# Patient Record
Sex: MALE | Race: NATIVE HAWAIIAN OR OTHER PACIFIC ISLANDER | NOT HISPANIC OR LATINO | Employment: FULL TIME | URBAN - METROPOLITAN AREA
[De-identification: names, ages, dates, MRNs, and addresses within clinical notes are randomized per-mention and may not be internally consistent; named-entity substitution may affect disease eponyms.]

---

## 2020-08-06 ENCOUNTER — APPOINTMENT (OUTPATIENT)
Dept: RADIOLOGY | Facility: MEDICAL CENTER | Age: 50
End: 2020-08-06
Attending: EMERGENCY MEDICINE

## 2020-08-06 ENCOUNTER — HOSPITAL ENCOUNTER (EMERGENCY)
Facility: MEDICAL CENTER | Age: 50
End: 2020-08-06
Attending: EMERGENCY MEDICINE

## 2020-08-06 VITALS
SYSTOLIC BLOOD PRESSURE: 128 MMHG | HEART RATE: 83 BPM | DIASTOLIC BLOOD PRESSURE: 67 MMHG | BODY MASS INDEX: 42.66 KG/M2 | HEIGHT: 72 IN | WEIGHT: 315 LBS | RESPIRATION RATE: 13 BRPM | OXYGEN SATURATION: 96 % | TEMPERATURE: 97.5 F

## 2020-08-06 DIAGNOSIS — L73.9 FOLLICULITIS: ICD-10-CM

## 2020-08-06 DIAGNOSIS — T78.40XA ALLERGIC REACTION, INITIAL ENCOUNTER: ICD-10-CM

## 2020-08-06 LAB
ALBUMIN SERPL BCP-MCNC: 3.9 G/DL (ref 3.2–4.9)
ALBUMIN/GLOB SERPL: 1.1 G/DL
ALP SERPL-CCNC: 47 U/L (ref 30–99)
ALT SERPL-CCNC: 37 U/L (ref 2–50)
ANION GAP SERPL CALC-SCNC: 12 MMOL/L (ref 7–16)
AST SERPL-CCNC: 34 U/L (ref 12–45)
BASOPHILS # BLD AUTO: 0.4 % (ref 0–1.8)
BASOPHILS # BLD: 0.03 K/UL (ref 0–0.12)
BILIRUB SERPL-MCNC: 0.2 MG/DL (ref 0.1–1.5)
BUN SERPL-MCNC: 19 MG/DL (ref 8–22)
CALCIUM SERPL-MCNC: 8.7 MG/DL (ref 8.5–10.5)
CHLORIDE SERPL-SCNC: 100 MMOL/L (ref 96–112)
CO2 SERPL-SCNC: 23 MMOL/L (ref 20–33)
CREAT SERPL-MCNC: 1.11 MG/DL (ref 0.5–1.4)
EKG IMPRESSION: NORMAL
EOSINOPHIL # BLD AUTO: 0.23 K/UL (ref 0–0.51)
EOSINOPHIL NFR BLD: 3 % (ref 0–6.9)
ERYTHROCYTE [DISTWIDTH] IN BLOOD BY AUTOMATED COUNT: 44.9 FL (ref 35.9–50)
GLOBULIN SER CALC-MCNC: 3.5 G/DL (ref 1.9–3.5)
GLUCOSE SERPL-MCNC: 104 MG/DL (ref 65–99)
HCT VFR BLD AUTO: 46.4 % (ref 42–52)
HGB BLD-MCNC: 15.5 G/DL (ref 14–18)
IMM GRANULOCYTES # BLD AUTO: 0.03 K/UL (ref 0–0.11)
IMM GRANULOCYTES NFR BLD AUTO: 0.4 % (ref 0–0.9)
LYMPHOCYTES # BLD AUTO: 1.86 K/UL (ref 1–4.8)
LYMPHOCYTES NFR BLD: 24.3 % (ref 22–41)
MCH RBC QN AUTO: 29.8 PG (ref 27–33)
MCHC RBC AUTO-ENTMCNC: 33.4 G/DL (ref 33.7–35.3)
MCV RBC AUTO: 89.2 FL (ref 81.4–97.8)
MONOCYTES # BLD AUTO: 0.84 K/UL (ref 0–0.85)
MONOCYTES NFR BLD AUTO: 11 % (ref 0–13.4)
NEUTROPHILS # BLD AUTO: 4.66 K/UL (ref 1.82–7.42)
NEUTROPHILS NFR BLD: 60.9 % (ref 44–72)
NRBC # BLD AUTO: 0 K/UL
NRBC BLD-RTO: 0 /100 WBC
PLATELET # BLD AUTO: 153 K/UL (ref 164–446)
PMV BLD AUTO: 10.2 FL (ref 9–12.9)
POTASSIUM SERPL-SCNC: 4.2 MMOL/L (ref 3.6–5.5)
PROT SERPL-MCNC: 7.4 G/DL (ref 6–8.2)
RBC # BLD AUTO: 5.2 M/UL (ref 4.7–6.1)
SODIUM SERPL-SCNC: 135 MMOL/L (ref 135–145)
TROPONIN T SERPL-MCNC: 7 NG/L (ref 6–19)
WBC # BLD AUTO: 7.7 K/UL (ref 4.8–10.8)

## 2020-08-06 PROCEDURE — 93005 ELECTROCARDIOGRAM TRACING: CPT | Performed by: EMERGENCY MEDICINE

## 2020-08-06 PROCEDURE — 80053 COMPREHEN METABOLIC PANEL: CPT

## 2020-08-06 PROCEDURE — A9270 NON-COVERED ITEM OR SERVICE: HCPCS | Performed by: EMERGENCY MEDICINE

## 2020-08-06 PROCEDURE — 99284 EMERGENCY DEPT VISIT MOD MDM: CPT

## 2020-08-06 PROCEDURE — 70450 CT HEAD/BRAIN W/O DYE: CPT | Mod: MF

## 2020-08-06 PROCEDURE — 85025 COMPLETE CBC W/AUTO DIFF WBC: CPT

## 2020-08-06 PROCEDURE — 700102 HCHG RX REV CODE 250 W/ 637 OVERRIDE(OP): Performed by: EMERGENCY MEDICINE

## 2020-08-06 PROCEDURE — 84484 ASSAY OF TROPONIN QUANT: CPT

## 2020-08-06 PROCEDURE — 71045 X-RAY EXAM CHEST 1 VIEW: CPT

## 2020-08-06 RX ORDER — DEXAMETHASONE 4 MG/1
12 TABLET ORAL ONCE
Status: COMPLETED | OUTPATIENT
Start: 2020-08-06 | End: 2020-08-06

## 2020-08-06 RX ORDER — CLINDAMYCIN HYDROCHLORIDE 150 MG/1
150 CAPSULE ORAL 3 TIMES DAILY
Qty: 30 CAP | Refills: 0 | Status: SHIPPED
Start: 2020-08-06 | End: 2021-07-14

## 2020-08-06 RX ORDER — ACETAMINOPHEN 325 MG/1
650 TABLET ORAL EVERY 4 HOURS PRN
Status: SHIPPED | COMMUNITY
End: 2021-07-14

## 2020-08-06 RX ORDER — DIPHENHYDRAMINE HCL 25 MG
50 TABLET ORAL ONCE
Status: COMPLETED | OUTPATIENT
Start: 2020-08-06 | End: 2020-08-06

## 2020-08-06 RX ADMIN — DIPHENHYDRAMINE HYDROCHLORIDE 50 MG: 25 TABLET ORAL at 10:53

## 2020-08-06 RX ADMIN — DEXAMETHASONE 12 MG: 4 TABLET ORAL at 10:53

## 2020-08-06 ASSESSMENT — LIFESTYLE VARIABLES: DO YOU DRINK ALCOHOL: NO

## 2020-08-06 NOTE — ED PROVIDER NOTES
"ED Provider Note    CHIEF COMPLAINT  Chief Complaint   Patient presents with   • Lump     L head   • Sweats   • Dizziness     x4 days   • Headache     x4 days   • Eye Swelling     R eye x4 days, denies vision change.       HPI  Mabel Rivera is a 50 y.o. male here for evaluation of generalized dizziness, and headache.  Patient states he has had some right upper eyelid swelling as well, and has attributed this to the new ibuprofen medication he took.  Patient has no vomiting, or diarrhea.  Patient states his been here for a few weeks and is visiting from [place], his 2 daughters.  He has no chest pain, and no shortness of breath.  He has no abdominal pain.  He has not taken anything for the eye swelling, and again noticed just after he took the new ibuprofen generic brand.      ROS  See HPI for further details, o/w negative.     PAST MEDICAL HISTORY   No bleeding disorders    SOCIAL HISTORY  Social History     Tobacco Use   • Smoking status: Current Some Day Smoker   • Smokeless tobacco: Never Used   Substance and Sexual Activity   • Alcohol use: Not Currently     Comment: \"clean x5 months\"   • Drug use: Not Currently   • Sexual activity: Not on file       Family History  No bleeding disorders    SURGICAL HISTORY  patient denies any surgical history    CURRENT MEDICATIONS  Home Medications    **Home medications have not yet been reviewed for this encounter**         ALLERGIES  No Known Allergies    REVIEW OF SYSTEMS  See HPI for further details. Review of systems as above, otherwise all other systems are negative.     PHYSICAL EXAM  Constitutional: Well developed, well nourished. No acute distress.  HEENT: Normocephalic, atraumatic. Posterior pharynx clear and moist.  Eyes:  EOMI. Normal sclera.  Right upper eyelid with mild edema, no erythema, not periorbital, no tenderness.  Neck: Supple, Full range of motion, nontender.  Left lateral base of the neck with small nodule noted, mild tenderness to palpation, no " erythema.  Chest/Pulmonary: clear to ausculation. Symmetrical expansion.   Cardio: Regular rate and rhythm with no murmur.   Abdomen: Soft, nontender. No peritoneal signs. No guarding. No palpable masses.  Back: No CVA tenderness, nontender midline, no step offs.  Musculoskeletal: No deformity, no edema, neurovascular intact.   Neuro: Clear speech, appropriate, cooperative, cranial nerves II-XII grossly intact.  Psych: Normal mood and affect    Results for orders placed or performed during the hospital encounter of 08/06/20   CBC WITH DIFFERENTIAL   Result Value Ref Range    WBC 7.7 4.8 - 10.8 K/uL    RBC 5.20 4.70 - 6.10 M/uL    Hemoglobin 15.5 14.0 - 18.0 g/dL    Hematocrit 46.4 42.0 - 52.0 %    MCV 89.2 81.4 - 97.8 fL    MCH 29.8 27.0 - 33.0 pg    MCHC 33.4 (L) 33.7 - 35.3 g/dL    RDW 44.9 35.9 - 50.0 fL    Platelet Count 153 (L) 164 - 446 K/uL    MPV 10.2 9.0 - 12.9 fL    Neutrophils-Polys 60.90 44.00 - 72.00 %    Lymphocytes 24.30 22.00 - 41.00 %    Monocytes 11.00 0.00 - 13.40 %    Eosinophils 3.00 0.00 - 6.90 %    Basophils 0.40 0.00 - 1.80 %    Immature Granulocytes 0.40 0.00 - 0.90 %    Nucleated RBC 0.00 /100 WBC    Neutrophils (Absolute) 4.66 1.82 - 7.42 K/uL    Lymphs (Absolute) 1.86 1.00 - 4.80 K/uL    Monos (Absolute) 0.84 0.00 - 0.85 K/uL    Eos (Absolute) 0.23 0.00 - 0.51 K/uL    Baso (Absolute) 0.03 0.00 - 0.12 K/uL    Immature Granulocytes (abs) 0.03 0.00 - 0.11 K/uL    NRBC (Absolute) 0.00 K/uL   COMP METABOLIC PANEL   Result Value Ref Range    Sodium 135 135 - 145 mmol/L    Potassium 4.2 3.6 - 5.5 mmol/L    Chloride 100 96 - 112 mmol/L    Co2 23 20 - 33 mmol/L    Anion Gap 12.0 7.0 - 16.0    Glucose 104 (H) 65 - 99 mg/dL    Bun 19 8 - 22 mg/dL    Creatinine 1.11 0.50 - 1.40 mg/dL    Calcium 8.7 8.5 - 10.5 mg/dL    AST(SGOT) 34 12 - 45 U/L    ALT(SGPT) 37 2 - 50 U/L    Alkaline Phosphatase 47 30 - 99 U/L    Total Bilirubin 0.2 0.1 - 1.5 mg/dL    Albumin 3.9 3.2 - 4.9 g/dL    Total Protein 7.4 6.0  - 8.2 g/dL    Globulin 3.5 1.9 - 3.5 g/dL    A-G Ratio 1.1 g/dL   TROPONIN   Result Value Ref Range    Troponin T 7 6 - 19 ng/L   ESTIMATED GFR   Result Value Ref Range    GFR If African American >60 >60 mL/min/1.73 m 2    GFR If Non African American >60 >60 mL/min/1.73 m 2   EKG   Result Value Ref Range    Report       [facility] Emergency Dept.    Test Date:  2020  Pt Name:    ELISEO MASTERS                 Department: ER  MRN:        [id number]                      Room:        19  Gender:     Male                         Technician: [id number]  :        1970                   Requested By:NUZHAT CABRALES  Order #:    [id number]                    Reading MD:    Measurements  Intervals                                Axis  Rate:       74                           P:          -8  DE:         192                          QRS:        7  QRSD:       92                           T:          10  QT:         408  QTc:        453    Interpretive Statements  SINUS RHYTHM  No previous ECG available for comparison       CT-HEAD W/O   Final Result      No acute intracranial abnormality is identified.      Soft tissue swelling of the scalp at the vertex, right greater than left.      Paranasal sinus disease.      Maxillary periapical lucencies.      DX-CHEST-PORTABLE (1 VIEW)   Final Result         1. No acute cardiopulmonary abnormalities are identified.        Ekg; normal sinus rhythm at a rate of 74.  No ST elevation, no ST depression.  QTC is 453.  DE is 192.    PROCEDURES     MEDICAL RECORD  I have reviewed patient's medical record and pertinent results are listed.    COURSE & MEDICAL DECISION MAKING  I have reviewed any medical record information, laboratory studies and radiographic results as noted above.    If you have had any blood pressure issues while here in the emergency department, please see your doctor for a further evaluation or work up.    10:21 AM  At this time the patient is  nontoxic-appearing, afebrile, and appears comfortable.  I am going to treat him for an allergic reaction here, secondary to the swelling over the top of the right eye.  He also has some scalp swelling as well.  He states this was as a result of taking some ibuprofen of a generic brand, but he also may have an underlying early folliculitis.  I will treat with antibiotics as well.  He will follow-up or return if any further issues or concerns.    Differential diagnoses include but not limited to: infection, folliculitis, allergic reaction    This patient presents with an allergic reaction and folliculitis  .  At this time, I have counseled the patient/family regarding their medications, pain control, and follow up.  They will continue their medications, if any, as prescribed.  They will return immediately for any worsening symptoms and/or any other medical concerns.  They will see their doctor, or contact the doctor provided, in 1-2 days for follow up.       FINAL IMPRESSION  1. Allergic reaction, initial encounter     2. Folliculitis             Electronically signed by: Russell Erazo D.O., 8/6/2020 8:34 AM

## 2020-08-06 NOTE — DISCHARGE INSTRUCTIONS
Please do not take the ibuprofen that you were taking any longer.  Try a different brand. Thank you

## 2020-08-06 NOTE — ED TRIAGE NOTES
Chief Complaint   Patient presents with   • Lump     L head   • Sweats   • Dizziness     x4 days   • Headache     x4 days   • Eye Swelling     R eye x4 days, denies vision change.     Patient to triage via ambualiton, with a steady gait, patient A&O x4.    Denies n/v.    Explained wait time and triage process to pt. Pt placed back in lobby, told to notify ED tech or triage RN of any changes, verbalized understanding.

## 2021-07-12 ENCOUNTER — TELEPHONE (OUTPATIENT)
Dept: SCHEDULING | Facility: IMAGING CENTER | Age: 51
End: 2021-07-12

## 2021-07-14 ENCOUNTER — OFFICE VISIT (OUTPATIENT)
Dept: MEDICAL GROUP | Facility: PHYSICIAN GROUP | Age: 51
End: 2021-07-14
Payer: COMMERCIAL

## 2021-07-14 ENCOUNTER — HOSPITAL ENCOUNTER (OUTPATIENT)
Dept: LAB | Facility: MEDICAL CENTER | Age: 51
End: 2021-07-14
Attending: INTERNAL MEDICINE
Payer: COMMERCIAL

## 2021-07-14 VITALS
SYSTOLIC BLOOD PRESSURE: 122 MMHG | WEIGHT: 312 LBS | TEMPERATURE: 97.6 F | HEART RATE: 75 BPM | HEIGHT: 72 IN | RESPIRATION RATE: 16 BRPM | DIASTOLIC BLOOD PRESSURE: 72 MMHG | BODY MASS INDEX: 42.26 KG/M2 | OXYGEN SATURATION: 96 %

## 2021-07-14 DIAGNOSIS — T78.40XA ALLERGY, INITIAL ENCOUNTER: ICD-10-CM

## 2021-07-14 DIAGNOSIS — Z00.00 WELL ADULT EXAM: ICD-10-CM

## 2021-07-14 DIAGNOSIS — Z76.89 ENCOUNTER TO ESTABLISH CARE WITH NEW DOCTOR: ICD-10-CM

## 2021-07-14 DIAGNOSIS — E66.3 OVERWEIGHT: ICD-10-CM

## 2021-07-14 DIAGNOSIS — H91.93 BILATERAL HEARING LOSS, UNSPECIFIED HEARING LOSS TYPE: ICD-10-CM

## 2021-07-14 DIAGNOSIS — Z12.11 COLON CANCER SCREENING: ICD-10-CM

## 2021-07-14 PROBLEM — H91.90 HEARING LOSS: Status: ACTIVE | Noted: 2021-07-14

## 2021-07-14 LAB
ALT SERPL-CCNC: 29 U/L (ref 2–50)
ANION GAP SERPL CALC-SCNC: 10 MMOL/L (ref 7–16)
BUN SERPL-MCNC: 16 MG/DL (ref 8–22)
CALCIUM SERPL-MCNC: 8.9 MG/DL (ref 8.5–10.5)
CHLORIDE SERPL-SCNC: 103 MMOL/L (ref 96–112)
CHOLEST SERPL-MCNC: 194 MG/DL (ref 100–199)
CO2 SERPL-SCNC: 24 MMOL/L (ref 20–33)
CREAT SERPL-MCNC: 1.02 MG/DL (ref 0.5–1.4)
CREAT UR-MCNC: 140.4 MG/DL
ERYTHROCYTE [DISTWIDTH] IN BLOOD BY AUTOMATED COUNT: 47.4 FL (ref 35.9–50)
EST. AVERAGE GLUCOSE BLD GHB EST-MCNC: 120 MG/DL
FASTING STATUS PATIENT QL REPORTED: NORMAL
GLUCOSE SERPL-MCNC: 90 MG/DL (ref 65–99)
HBA1C MFR BLD: 5.8 % (ref 4–5.6)
HCT VFR BLD AUTO: 51 % (ref 42–52)
HDLC SERPL-MCNC: 43 MG/DL
HGB BLD-MCNC: 16.4 G/DL (ref 14–18)
LDLC SERPL CALC-MCNC: 103 MG/DL
MCH RBC QN AUTO: 29.1 PG (ref 27–33)
MCHC RBC AUTO-ENTMCNC: 32.2 G/DL (ref 33.7–35.3)
MCV RBC AUTO: 90.4 FL (ref 81.4–97.8)
MICROALBUMIN UR-MCNC: 1.3 MG/DL
MICROALBUMIN/CREAT UR: 9 MG/G (ref 0–30)
PLATELET # BLD AUTO: 193 K/UL (ref 164–446)
PMV BLD AUTO: 10.6 FL (ref 9–12.9)
POTASSIUM SERPL-SCNC: 4.5 MMOL/L (ref 3.6–5.5)
RBC # BLD AUTO: 5.64 M/UL (ref 4.7–6.1)
SODIUM SERPL-SCNC: 137 MMOL/L (ref 135–145)
TRIGL SERPL-MCNC: 239 MG/DL (ref 0–149)
WBC # BLD AUTO: 5.9 K/UL (ref 4.8–10.8)

## 2021-07-14 PROCEDURE — 99204 OFFICE O/P NEW MOD 45 MIN: CPT | Performed by: INTERNAL MEDICINE

## 2021-07-14 PROCEDURE — 84460 ALANINE AMINO (ALT) (SGPT): CPT

## 2021-07-14 PROCEDURE — 86003 ALLG SPEC IGE CRUDE XTRC EA: CPT | Mod: 91

## 2021-07-14 PROCEDURE — 80061 LIPID PANEL: CPT

## 2021-07-14 PROCEDURE — 85027 COMPLETE CBC AUTOMATED: CPT

## 2021-07-14 PROCEDURE — 36415 COLL VENOUS BLD VENIPUNCTURE: CPT

## 2021-07-14 PROCEDURE — 82043 UR ALBUMIN QUANTITATIVE: CPT

## 2021-07-14 PROCEDURE — 82306 VITAMIN D 25 HYDROXY: CPT

## 2021-07-14 PROCEDURE — 80048 BASIC METABOLIC PNL TOTAL CA: CPT

## 2021-07-14 PROCEDURE — 84443 ASSAY THYROID STIM HORMONE: CPT

## 2021-07-14 PROCEDURE — 82570 ASSAY OF URINE CREATININE: CPT

## 2021-07-14 PROCEDURE — 82785 ASSAY OF IGE: CPT

## 2021-07-14 PROCEDURE — 84153 ASSAY OF PSA TOTAL: CPT

## 2021-07-14 PROCEDURE — 83036 HEMOGLOBIN GLYCOSYLATED A1C: CPT

## 2021-07-14 ASSESSMENT — FIBROSIS 4 INDEX: FIB4 SCORE: 1.9

## 2021-07-14 ASSESSMENT — PATIENT HEALTH QUESTIONNAIRE - PHQ9: CLINICAL INTERPRETATION OF PHQ2 SCORE: 0

## 2021-07-14 NOTE — ASSESSMENT & PLAN NOTE
Chronic condition.  Patient denies ear pain or tinnitus.  Patient reported that when he was younger he has had chronic water exposure/swimming on the beach.  The patient attributed to probable previous history ear infections

## 2021-07-14 NOTE — PROGRESS NOTES
CC: Establish care  Reduce hearing  Requests lab test      HPI: This is a 51 y.o. pt.  Pt's medical history is notable for:     Encounter to establish care with new doctor  Patient presents today to establish care with new primary care provider.  Last physician visit was more than 1 year ago.    Allergies  Patient reported allergies to foods such as bananas, cantaloupe and watermelon.  He wishes to have food allergy panel testing to be done.  Patient denies fever chills shortness of breath or wheezing.  No rash noted.    Hearing loss  Chronic condition.  Patient denies ear pain or tinnitus.  Patient reported that when he was younger he has had chronic water exposure/swimming on the beach.  The patient attributed to probable previous history ear infections    Overweight  This is chronic condition.   Pt is aware of elevated BMI.  Brief discussion with the patient regarding diet, exercise, and lifestyle modification to help achieve and maintain healthy weight              REVIEW OF SYSTEMS:     Constitutional:  no fever / chills   Neurologic: no headaches  Eyes: no changes in vision  ENT: no sore throat, no hearing loss  CV:  no chest pain, no palpitations  Pulmonary: no SOB, no cough          Allergies: Banana, Cantaloupe, and Watermelon [citrullus vulgaris]    Current Outpatient Medications Ordered in Epic   Medication Sig Dispense Refill   • IBUPROFEN PO Take  by mouth.       No current Epic-ordered facility-administered medications on file.       Past Medical, Social, and Family history reviewed and updated in EPIC     ------------------------------------------------------------------------------     PHYSICAL EXAM:   Vitals:    07/14/21 0759   BP: 122/72   Pulse: 75   Resp: 16   Temp: 36.4 °C (97.6 °F)   SpO2: 96%        Vitals:    07/14/21 0759   BP: 122/72   Weight: (!) 142 kg (312 lb)   Height: 1.829 m (6')         Body mass index is 42.31 kg/m².    Constitutional: no acute distress  CV: heart RRR  Resp: normal  effort, no wheezing or rales.  GI: abdomen soft, no obvious mass, no tenderness  Neuro: CN 2-12 grossly intact  SIVA: Patient declined examination today      -----------------------------------------------------------------------------    ASSESSMENT:   1. Encounter to establish care with new doctor     2. Overweight     3. Bilateral hearing loss, unspecified hearing loss type  REFERRAL TO AUDIOLOGY   4. Colon cancer screening  REFERRAL TO GI FOR COLONOSCOPY   5. Well adult exam  HEMOGLOBIN A1C    ALANINE AMINO-TRANS    Basic Metabolic Panel    CBC WITHOUT DIFFERENTIAL    Lipid Profile    PROSTATE SPECIFIC AG SCREENING    TSH    MICROALBUMIN CREAT RATIO URINE    VITAMIN D,25 HYDROXY   6. Allergy, initial encounter  ALLERGEN PROFILE, FOOD-BASIC           MEDICAL DECISION MAKING: DISCUSSION / STATUS / PLAN:    Routine lab work ordered today.  Patient declined routine immunization as he is getting ready for the second Covid immunization.  Refer the patient to audiology  Recommend diet and exercise and encourage regular exercise activity program.  Recommend for the patient to try to lose weight.  Food allergy panel testing requested  Advised the patient to follow-up after these labs are done.     Return in about 1 year (around 7/14/2022).     -If any problems should arise, patient was advised to contact our office or go to ER to be evaluated.    Please note that this dictation was created using voice recognition software. I have made every reasonable attempt to correct obvious errors, but it is possible there are errors of grammar and possibly content that I did not discover before finalizing the note.

## 2021-07-14 NOTE — ASSESSMENT & PLAN NOTE
Patient presents today to establish care with new primary care provider.  Last physician visit was more than 1 year ago.

## 2021-07-14 NOTE — ASSESSMENT & PLAN NOTE
Patient reported allergies to foods such as bananas, cantaloupe and watermelon.  He wishes to have food allergy panel testing to be done.  Patient denies fever chills shortness of breath or wheezing.  No rash noted.

## 2021-07-15 PROBLEM — R73.03 PREDIABETES: Status: ACTIVE | Noted: 2021-07-15

## 2021-07-15 PROBLEM — E78.5 HYPERLIPIDEMIA: Status: ACTIVE | Noted: 2021-07-15

## 2021-07-15 LAB
25(OH)D3 SERPL-MCNC: 26 NG/ML (ref 30–100)
PSA SERPL-MCNC: 0.69 NG/ML (ref 0–4)
TSH SERPL DL<=0.005 MIU/L-ACNC: 0.96 UIU/ML (ref 0.38–5.33)

## 2021-07-19 LAB
ALMOND IGE QN: 0.31 KU/L
AVOCADO IGE QN: 0.33 KU/L
BANANA IGE QN: 0.69 KU/L
CELERY IGE QN: 0.88 KU/L
CHESTNUT IGE QN: 0.44 KU/L
COCONUT IGE QN: 0.61 KU/L
COW MILK IGE QN: <0.1 KU/L
DEPRECATED MISC ALLERGEN IGE RAST QL: ABNORMAL
EGG WHITE IGE QN: <0.1 KU/L
GRAPE IGE QN: 0.13 KU/L
IGE SERPL-ACNC: 612 KU/L
KIWIFRUIT IGE QN: 0.49 KU/L
OAT IGE QN: 0.19 KU/L
PAPAYA IGE QN: 0.75 KU/L
PEANUT IGE QN: 1.15 KU/L
PECAN/HICK NUT IGE QN: <0.1 KU/L
POTATO IGE QN: 1.37 KU/L
SESAME SEED IGE QN: 1.23 KU/L
SOYBEAN IGE QN: 0.36 KU/L
TOMATO IGE QN: 1.53 KU/L
WATERMELON IGE QN: 0.21 KU/L
WHEAT IGE QN: 0.49 KU/L

## 2021-07-20 ENCOUNTER — TELEPHONE (OUTPATIENT)
Dept: MEDICAL GROUP | Facility: PHYSICIAN GROUP | Age: 51
End: 2021-07-20

## 2021-07-20 NOTE — TELEPHONE ENCOUNTER
1st attempt to contact patient.  Left voicemail in regards to allergy results.  Asked patient to return the call by contacting the office at [phone].

## 2021-07-20 NOTE — TELEPHONE ENCOUNTER
----- Message from Homer Rondon M.D. sent at 7/19/2021  4:04 PM PDT -----  Please notify patient :     Recent allergy testing showed patient is allergic to banana, celery, coconut, kiwi fruit, papaya, peanut, potato, sesame seed, soybean, [place], tomato, and wheat    Please recommend for the patient to avoid these  products .

## 2021-08-28 ENCOUNTER — APPOINTMENT (OUTPATIENT)
Dept: RADIOLOGY | Facility: MEDICAL CENTER | Age: 51
End: 2021-08-28
Attending: EMERGENCY MEDICINE
Payer: COMMERCIAL

## 2021-08-28 ENCOUNTER — HOSPITAL ENCOUNTER (EMERGENCY)
Facility: MEDICAL CENTER | Age: 51
End: 2021-08-29
Attending: EMERGENCY MEDICINE
Payer: COMMERCIAL

## 2021-08-28 VITALS
BODY MASS INDEX: 42.25 KG/M2 | RESPIRATION RATE: 18 BRPM | HEART RATE: 67 BPM | WEIGHT: 311.95 LBS | TEMPERATURE: 97.4 F | HEIGHT: 72 IN | SYSTOLIC BLOOD PRESSURE: 163 MMHG | OXYGEN SATURATION: 95 % | DIASTOLIC BLOOD PRESSURE: 100 MMHG

## 2021-08-28 DIAGNOSIS — Z20.822 SUSPECTED COVID-19 VIRUS INFECTION: ICD-10-CM

## 2021-08-28 PROCEDURE — U0003 INFECTIOUS AGENT DETECTION BY NUCLEIC ACID (DNA OR RNA); SEVERE ACUTE RESPIRATORY SYNDROME CORONAVIRUS 2 (SARS-COV-2) (CORONAVIRUS DISEASE [COVID-19]), AMPLIFIED PROBE TECHNIQUE, MAKING USE OF HIGH THROUGHPUT TECHNOLOGIES AS DESCRIBED BY CMS-2020-01-R: HCPCS

## 2021-08-28 PROCEDURE — U0005 INFEC AGEN DETEC AMPLI PROBE: HCPCS

## 2021-08-28 PROCEDURE — 99283 EMERGENCY DEPT VISIT LOW MDM: CPT

## 2021-08-28 PROCEDURE — 71045 X-RAY EXAM CHEST 1 VIEW: CPT

## 2021-08-28 ASSESSMENT — FIBROSIS 4 INDEX: FIB4 SCORE: 1.7

## 2021-08-28 ASSESSMENT — PAIN DESCRIPTION - PAIN TYPE: TYPE: ACUTE PAIN

## 2021-08-29 NOTE — ED NOTES
Pt from the lobby to red 6 with steady gait. Pt changed into gown and placed on continuous cardiac, BP and O2 monitors. Initial assessment complete. ERP to see.

## 2021-08-29 NOTE — ED PROVIDER NOTES
ER Provider Note     Scribed for Chuy Marques M.D. by Jenny Ayala. 8/28/2021, 10:31 PM.    Primary Care Provider: Homer Rondon M.D.  Means of Arrival: Walk-in   History obtained from: Patient  History limited by: None     CHIEF COMPLAINT  Chief Complaint   Patient presents with    Muscle Pain     PT C/O MUSCLE PAIN/ACHES WITH HEADACHE STARTED YESTERDAY, STATES HE WORKS AT [employer] AND [employer] WORKER WAS ILL, NOW C/O TIRED AND FATIGUE    Headache     HPI  Mabel Rivera is a 51 y.o. male who presents to the Emergency Department for evaluation of generalized muscle aches. The patient describes muscle pain/aches in his back, neck, and arms. He endorses associated headache, mild shortness of breath, and sneezing. The patient states he works at [employer] and reports possible COVID exposure last week. He is concerned he may have COVID. The patient received the [employer] COVID vaccine. Denies diarrhea, nausea, vomiting, constipation, sore throat, rhinorrhea, or photophobia.     PPE Note: I personally donned full PPE for all patient encounters during this visit, including being clean-shaven with an N95 respirator mask, gloves, gown, and goggles.     Scribe remained outside the patient's room and did not have any contact with the patient for the duration of patient encounter.     REVIEW OF SYSTEMS  See HPI for further details. All other systems are negative.     PAST MEDICAL HISTORY   None noted    SURGICAL HISTORY  patient denies any surgical history    SOCIAL HISTORY  Social History     Tobacco Use    Smoking status: Current Some Day Smoker     Types: Cigarettes    Smokeless tobacco: Never Used   Vaping Use    Vaping Use: Never used   Substance Use Topics    Alcohol use: Yes    Drug use: Not Currently      Social History     Substance and Sexual Activity   Drug Use Not Currently     FAMILY HISTORY  Family History   Problem Relation Age of Onset    Asthma Mother     Asthma Sister     Cancer Brother          1/2  brother w lung cancer     CURRENT MEDICATIONS  Home Medications       Reviewed by Elena Echeverria R.N. (Registered Nurse) on 08/28/21 at 1917  Med List Status: Not Addressed     Medication Last Dose Status   IBUPROFEN PO  Active                  ALLERGIES  Allergies   Allergen Reactions    Banana     Cantaloupe     Watermelon [Citrullus Vulgaris]      PHYSICAL EXAM  VITAL SIGNS: BP (!) 168/117   Pulse 87   Temp 36.3 °C (97.3 °F) (Temporal)   Resp 16   Ht 1.829 m (6')   Wt (!) 141 kg (311 lb 15.2 oz)   SpO2 95%   BMI 42.31 kg/m²      Constitutional: Alert in no apparent distress.  HENT: No signs of trauma, Bilateral external ears normal, Nose normal.   Eyes: Pupils are equal and reactive, Conjunctiva normal, Non-icteric.   Neck: Normal range of motion, No tenderness, Supple, No stridor.   Lymphatic: No lymphadenopathy noted.   Cardiovascular: Regular rate and rhythm, no palpable thrill  Thorax & Lungs: Decreased breath sounds in right lower, No chest tenderness.   Abdomen: Bowel sounds normal, Soft, No tenderness, No masses, No pulsatile masses. No peritoneal signs.  Skin: Warm, Dry, No erythema, No rash.   Back: No bony tenderness, No CVA tenderness.   Extremities: Intact distal pulses, No edema, No tenderness, No cyanosis.  Musculoskeletal: Good range of motion in all major joints. No tenderness to palpation or major deformities noted.   Neurologic: Alert , Normal motor function, Normal sensory function, No focal deficits noted.   Psychiatric: Affect normal, Judgment normal, Mood normal.     DIAGNOSTIC STUDIES / PROCEDURES    LABS  Labs Reviewed   SARS-COV-2, PCR (IN-HOUSE)    Narrative:     Have you been in close contact with a person who is suspected  or known to be positive for COVID-19 within the last 30 days  (e.g. last seen that person < 30 days ago)->Unknown     All labs reviewed by me.    RADIOLOGY  DX-CHEST-PORTABLE (1 VIEW)   Final Result      No acute cardiopulmonary disease.         The  radiologist's interpretation of all radiological studies have been reviewed by me.    COURSE & MEDICAL DECISION MAKING  Pertinent Labs & Imaging studies reviewed. (See chart for details)    This is a 51 y.o. male that presents with symptoms concerning for COVID-19 infection.  I do not believe there is a myositis.  There is no bony tenderness.  We will get a screening Covid test.  He does have some asymmetric breath sounds therefore we will get a chest x-ray..     10:31 PM - Patient seen and examined at bedside. Ordered DX-Chest and SARS-CoV-2 PCR.     The patient will return for new or worsening symptoms and is stable at the time of discharge.    X-ray is negative.  I will discharge him home with strict Covid return precautions.    The patient is referred to a primary physician for blood pressure management, diabetic screening, and for all other preventative health concerns.    DISPOSITION:  Patient will be discharged home in stable condition.    FOLLOW UP:  Homer Rondon M.D.  [address] [place] [id number]  [phone]        OUTPATIENT MEDICATIONS:  Discharge Medication List as of 8/29/2021 12:14 AM        FINAL IMPRESSION  1. Suspected COVID-19 virus infection          Jenny HEART (Tiffanie), am scribing for, and in the presence of, Chuy Marques M.D..    Electronically signed by: Jenny Ayala (Tiffanie), 8/28/2021    Chuy HEART M.D. personally performed the services described in this documentation, as scribed by Jenny Ayala in my presence, and it is both accurate and complete.     E.     The note accurately reflects work and decisions made by me.  Chuy Marques M.D.  8/29/2021  4:52 AM

## 2021-08-29 NOTE — DISCHARGE INSTRUCTIONS
Your test results:  You have been tested for COVID-19 today. Your results are pending. Please act as if these results are positive and self isolate until you receive the results. Make sure you still wear a mask, social distance and practice good hand hygiene no matterthe result. In order to receive the results you will need to log into your mychart on the [facility] website. If you do not have an account you can create an account. You can login or create an account for [employer] at [email or web address].  Quarantine Criteria:  If your COVID test is positive self isolation at home is required.  Per the CDC guidelines, you must quarantine at home until the following conditions have been met:  It has been 10 days since the onset of symptoms  You have been fever free for 24 hours  Your symptoms are improving.     Self Care:  You need to get plenty of rest.   You should take Tylenol as needed for fever and body aches  Drink plenty of fluids and have good nutrition    Community Care:  If you have no choice and have to go out to get medications or food, please wear a mask and wash your hands frequently.    Please tell everyone you know to wear a mask when in public to help decrease transmission of the virus.  Per CDC guidelines, you are not required to provide a healthcare provider’s note to validate your illness or to return to work, as healthcare provider offices and medical facilities may be extremely busy and not able to provide such documentation in a timely way.    Return to the ER Precautions:  Return to the ED if the is any significant shortness of breath, worsening symptoms, not improving as expected or you develop any concerning symptoms.   If your symptoms worsen to a point that you become so short of breath that you can only walk very short distances prior to fatigue or feel you were unable to manage your symptoms at home please return to the emergency department. For a more objective approach you can buy a pulse  oximeter online. [employer] has multiple to choose from. If your oxygen saturation in these devices is persistently below 90% please return to the ER.

## 2021-08-29 NOTE — ED TRIAGE NOTES
Chief Complaint   Patient presents with   • Muscle Pain     PT C/O MUSCLE PAIN/ACHES WITH HEADACHE STARTED YESTERDAY, STATES HE WORKS AT [employer] AND [employer] WORKER WAS ILL, NOW C/O TIRED AND FATIGUE   • Headache   BP (!) 168/117   Pulse 87   Temp 36.3 °C (97.3 °F) (Temporal)   Resp 16   Ht 1.829 m (6')   Wt (!) 141 kg (311 lb 15.2 oz)   SpO2 95%   BMI 42.31 kg/m²

## 2021-08-29 NOTE — ED NOTES
Discharge teaching with paperwork regarding possible COviD-19 infection provided to pt. Pt verbalized understanding of teaching and all questions answered. Pt is A&Ox4 with stable vital signs, stable physical assessment. Pt ambulatory out of ED with steady gait with all personal belongings.

## 2021-08-30 LAB
SARS-COV-2 RNA RESP QL NAA+PROBE: NOTDETECTED
SPECIMEN SOURCE: NORMAL

## 2021-12-28 ENCOUNTER — NON-PROVIDER VISIT (OUTPATIENT)
Dept: MEDICAL GROUP | Facility: PHYSICIAN GROUP | Age: 51
End: 2021-12-28
Payer: COMMERCIAL

## 2021-12-28 DIAGNOSIS — Z23 NEED FOR VACCINATION: ICD-10-CM

## 2021-12-28 PROCEDURE — 90471 IMMUNIZATION ADMIN: CPT | Performed by: FAMILY MEDICINE

## 2021-12-28 PROCEDURE — 90686 IIV4 VACC NO PRSV 0.5 ML IM: CPT | Performed by: FAMILY MEDICINE

## 2021-12-28 NOTE — PROGRESS NOTES
"Mabel Rivera is a 51 y.o. male here for a non-provider visit for:   FLU    Reason for immunization: Annual Flu Vaccine  Immunization records indicate need for vaccine: Yes, confirmed with Epic and confirmed with [place] [employer]  Minimum interval has been met for this vaccine: Yes  ABN completed: Not Indicated    VIS Dated  8/6/21 was given to patient: Yes  All IAC Questionnaire questions were answered \"No.\"    Patient tolerated injection and no adverse effects were observed or reported: Yes    Pt scheduled for next dose in series: No  "

## 2022-05-06 ENCOUNTER — OFFICE VISIT (OUTPATIENT)
Dept: URGENT CARE | Facility: PHYSICIAN GROUP | Age: 52
End: 2022-05-06
Payer: COMMERCIAL

## 2022-05-06 ENCOUNTER — HOSPITAL ENCOUNTER (OUTPATIENT)
Facility: MEDICAL CENTER | Age: 52
End: 2022-05-06
Attending: PHYSICIAN ASSISTANT
Payer: COMMERCIAL

## 2022-05-06 VITALS
RESPIRATION RATE: 16 BRPM | WEIGHT: 300 LBS | HEIGHT: 72 IN | DIASTOLIC BLOOD PRESSURE: 72 MMHG | SYSTOLIC BLOOD PRESSURE: 132 MMHG | TEMPERATURE: 97.3 F | BODY MASS INDEX: 40.63 KG/M2 | OXYGEN SATURATION: 100 % | HEART RATE: 69 BPM

## 2022-05-06 DIAGNOSIS — J06.9 VIRAL URI WITH COUGH: ICD-10-CM

## 2022-05-06 LAB
EXTERNAL QUALITY CONTROL: NORMAL
FLUAV+FLUBV AG SPEC QL IA: NEGATIVE
INT CON NEG: NORMAL
INT CON POS: NORMAL
SARS-COV+SARS-COV-2 AG RESP QL IA.RAPID: NEGATIVE

## 2022-05-06 PROCEDURE — 87804 INFLUENZA ASSAY W/OPTIC: CPT | Performed by: PHYSICIAN ASSISTANT

## 2022-05-06 PROCEDURE — 87426 SARSCOV CORONAVIRUS AG IA: CPT | Performed by: PHYSICIAN ASSISTANT

## 2022-05-06 PROCEDURE — U0003 INFECTIOUS AGENT DETECTION BY NUCLEIC ACID (DNA OR RNA); SEVERE ACUTE RESPIRATORY SYNDROME CORONAVIRUS 2 (SARS-COV-2) (CORONAVIRUS DISEASE [COVID-19]), AMPLIFIED PROBE TECHNIQUE, MAKING USE OF HIGH THROUGHPUT TECHNOLOGIES AS DESCRIBED BY CMS-2020-01-R: HCPCS

## 2022-05-06 PROCEDURE — 99213 OFFICE O/P EST LOW 20 MIN: CPT | Performed by: PHYSICIAN ASSISTANT

## 2022-05-06 PROCEDURE — U0005 INFEC AGEN DETEC AMPLI PROBE: HCPCS

## 2022-05-06 ASSESSMENT — ENCOUNTER SYMPTOMS
MYALGIAS: 1
CHILLS: 1
DIZZINESS: 0
HEADACHES: 1
BLURRED VISION: 0
ABDOMINAL PAIN: 1
NAUSEA: 0
RHINORRHEA: 1
FEVER: 1
PALPITATIONS: 0
EYE PAIN: 0
SHORTNESS OF BREATH: 1
COUGH: 1
VOMITING: 0
DIARRHEA: 0
SORE THROAT: 0
SINUS PAIN: 0

## 2022-05-06 ASSESSMENT — FIBROSIS 4 INDEX: FIB4 SCORE: 1.73

## 2022-05-06 NOTE — LETTER
May 6, 2022         Patient: Mabel Rivera   YOB: 1970   Date of Visit: 5/6/2022           To Whom it May Concern:    Mabel Rivera was seen in my clinic on 5/6/2022. Patient is being tested for COVID-19 and may not return to work until test results are available.  If test results were to be positive, patient should follow CDC recommendations for self isolation.  Thank you for making appropriate accommodations as they recover.            Sincerely,           Daisha Echevarria P.A.-C.  Electronically Signed

## 2022-05-07 DIAGNOSIS — J06.9 VIRAL URI WITH COUGH: ICD-10-CM

## 2022-05-07 LAB
COVID ORDER STATUS COVID19: NORMAL
SARS-COV-2 RNA RESP QL NAA+PROBE: NOTDETECTED
SPECIMEN SOURCE: NORMAL

## 2022-05-07 NOTE — PROGRESS NOTES
Subjective     Mabel Rivera is a 52 y.o. male who presents with Cough (X2days, hurts to cough, some mild shortness of breath)      URI   This is a new problem. The current episode started in the past 7 days (started 2 days ago). The problem has been unchanged. Maximum temperature: Subjective fever last night. Associated symptoms include abdominal pain, congestion, coughing, headaches, rhinorrhea and sneezing. Pertinent negatives include no chest pain, diarrhea, ear pain, nausea, rash, sinus pain, sore throat or vomiting. He has tried decongestant and increased fluids for the symptoms. The treatment provided moderate relief.       Review of Systems   Constitutional: Positive for chills, fever and malaise/fatigue.   HENT: Positive for congestion, rhinorrhea and sneezing. Negative for ear pain, sinus pain and sore throat.    Eyes: Negative for blurred vision and pain.   Respiratory: Positive for cough and shortness of breath.    Cardiovascular: Negative for chest pain and palpitations.   Gastrointestinal: Positive for abdominal pain. Negative for diarrhea, nausea and vomiting.   Musculoskeletal: Positive for myalgias.   Skin: Negative for rash.   Neurological: Positive for headaches. Negative for dizziness.           PMH:  has no past medical history on file.  MEDS:   Current Outpatient Medications:   •  IBUPROFEN PO, Take  by mouth., Disp: , Rfl:   ALLERGIES:   Allergies   Allergen Reactions   • Banana    • Cantaloupe    • Watermelon [Citrullus Vulgaris]      SURGHX: History reviewed. No pertinent surgical history.  SOCHX:  reports that he has been smoking cigarettes. He has never used smokeless tobacco. He reports current alcohol use. He reports previous drug use.  FH: Family history was reviewed, no pertinent findings to report        Objective     /72   Pulse 69   Temp 36.3 °C (97.3 °F) (Tympanic)   Resp 16   Ht 1.829 m (6')   Wt (!) 136 kg (300 lb)   SpO2 100%   BMI 40.69 kg/m²      Physical  Exam  Constitutional:       Appearance: He is well-developed.   HENT:      Head: Normocephalic and atraumatic.      Right Ear: Tympanic membrane, ear canal and external ear normal.      Left Ear: Tympanic membrane, ear canal and external ear normal.      Nose: Mucosal edema, congestion and rhinorrhea present.      Mouth/Throat:      Lips: Pink.      Mouth: Mucous membranes are moist.      Pharynx: Uvula midline. Posterior oropharyngeal erythema present. No oropharyngeal exudate or uvula swelling.   Eyes:      Conjunctiva/sclera: Conjunctivae normal.      Pupils: Pupils are equal, round, and reactive to light.   Cardiovascular:      Rate and Rhythm: Normal rate and regular rhythm.      Heart sounds: Normal heart sounds. No murmur heard.  Pulmonary:      Effort: Pulmonary effort is normal.      Breath sounds: Normal breath sounds. No decreased breath sounds, wheezing, rhonchi or rales.   Musculoskeletal:      Cervical back: Normal range of motion.   Lymphadenopathy:      Cervical: No cervical adenopathy.   Skin:     General: Skin is warm and dry.      Capillary Refill: Capillary refill takes less than 2 seconds.   Neurological:      Mental Status: He is alert and oriented to person, place, and time.   Psychiatric:         Behavior: Behavior normal.         Judgment: Judgment normal.         POCT SARS-COV Antigen JEANNETTE (Symptomatic only) - Negative    POCT Influenza A/B - Negative    Assessment & Plan     1. Viral URI with cough  - POCT SARS-COV Antigen JEANNETTE (Symptomatic only)  - POCT Influenza A/B  - SARS-CoV-2, PCR (In-House); Future  - OTC cold/flu medications  -Supportive care also discussed include use of saline nasal rinses, steam inhalation, and the use of a cool-mist humidifier in the bedroom at night  - PO fluids  - Rest  - Tylenol or ibuprofen as needed for fever > 100.4 F  *Patient had a nasal swab to test for COVID-19 virus.  Patient was advised to stay home and self isolate/self quarantine while awaiting the  results.  Supportive care was reiterated.  Return/ER precautions discussed.               Differential Diagnosis, natural history, and supportive care discussed. Return to the Urgent Care or follow up with your PCP if symptoms fail to resolve, or for any new or worsening symptoms. Emergency room precautions discussed. Patient and/or family appears understanding of information.

## 2022-10-29 ENCOUNTER — OFFICE VISIT (OUTPATIENT)
Dept: URGENT CARE | Facility: PHYSICIAN GROUP | Age: 52
End: 2022-10-29
Payer: COMMERCIAL

## 2022-10-29 DIAGNOSIS — M54.12 CERVICAL RADICULOPATHY: ICD-10-CM

## 2022-10-29 PROCEDURE — 99213 OFFICE O/P EST LOW 20 MIN: CPT | Performed by: PHYSICIAN ASSISTANT

## 2022-10-29 NOTE — LETTER
October 29, 2022    To Whom It May Concern:         This is confirmation that Mabel Rivera attended his scheduled appointment with [facility] on 10/29/22.  I recommend this patient take today off of work and up to 3 days in total starting from today.  Once he is feeling capable he is able to return to work.  For work restrictions, FMLA, or further follow-up this must be done through primary care provider.         If you have any questions please do not hesitate to call me at the phone number listed below.    Sincerely,          Steve Leavitt, P.A.-C.  [phone]

## 2022-10-30 ASSESSMENT — ENCOUNTER SYMPTOMS
NECK PAIN: 1
VOMITING: 0
HEADACHES: 0
FEVER: 0
ABDOMINAL PAIN: 0
CHILLS: 0
EYE PAIN: 0
COUGH: 0
NAUSEA: 0
CONSTIPATION: 0
MYALGIAS: 0
SHORTNESS OF BREATH: 0
SORE THROAT: 0
DIARRHEA: 0

## 2022-10-30 NOTE — PROGRESS NOTES
Subjective:   Mabel Rivera is a 52 y.o. male who presents for Arm Pain (Left hand pain, goes numb at times, Pain starts shoulder and goes down been on and off for 1 year. )      Is a 52-year-old male who notes left-sided shoulder pain, left-sided neck pain as well as intermittent paresthesias described as pins-and-needles shooting down the lateral aspect of the left arm.  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  He takes Motrin occasionally which seems to help slightly.  He does do repetitive motions with his left hand at work.  He denies any trauma or injury.  He denies any weakness.  Occasionally gets numbness mostly in the fourth and fifth finger    Review of Systems   Constitutional:  Negative for chills and fever.   HENT:  Negative for congestion, ear pain and sore throat.    Eyes:  Negative for pain.   Respiratory:  Negative for cough and shortness of breath.    Cardiovascular:  Negative for chest pain.   Gastrointestinal:  Negative for abdominal pain, constipation, diarrhea, nausea and vomiting.   Genitourinary:  Negative for dysuria.   Musculoskeletal:  Positive for neck pain. Negative for myalgias.   Skin:  Negative for rash.   Neurological:  Negative for headaches.     Medications, Allergies, and current problem list reviewed today in Epic.     Objective:     There were no vitals taken for this visit.    Physical Exam  Vitals reviewed.   Constitutional:       Appearance: Normal appearance.   HENT:      Head: Normocephalic and atraumatic.      Right Ear: External ear normal.      Left Ear: External ear normal.      Nose: Nose normal.      Mouth/Throat:      Mouth: Mucous membranes are moist.   Eyes:      Conjunctiva/sclera: Conjunctivae normal.   Cardiovascular:      Rate and Rhythm: Normal rate.   Pulmonary:      Effort: Pulmonary effort is normal.   Musculoskeletal:      Comments: Tenderness palpation with muscle spasm left-sided trapezius left-sided  paraspinal muscles without midline step-off, crepitance or deformity.  Full range of motion of the cervical spine.  5 out of 5  strength with radial, median, ulnar nerve distribution intact.  Negative Tinel's, negative Finkelstein's, negative Phalen's   Skin:     General: Skin is warm and dry.      Capillary Refill: Capillary refill takes less than 2 seconds.   Neurological:      Mental Status: He is alert and oriented to person, place, and time.       Assessment/Plan:     Diagnosis and associated orders:     1. Cervical radiculopathy           Comments/MDM:     Patient with some obvious left-sided shoulder spasm and pain and some symptoms of cervical radiculopathy.  No evidence of neurologic aberrancy or serious red flags of stroke or similar etiology.  Discussed posture changes, ice versus heat, provided with work note.  Recommend regular use of anti-inflammatories and follow-up with primary for consideration of physical therapy         Differential diagnosis, natural history, supportive care, and indications for immediate follow-up discussed.    Advised the patient to follow-up with the primary care physician for recheck, reevaluation, and consideration of further management.    Please note that this dictation was created using voice recognition software. I have made a reasonable attempt to correct obvious errors, but I expect that there are errors of grammar and possibly content that I did not discover before finalizing the note.    This note was electronically signed by Steve Leavitt PA-C

## 2023-04-21 ENCOUNTER — APPOINTMENT (OUTPATIENT)
Dept: RADIOLOGY | Facility: MEDICAL CENTER | Age: 53
End: 2023-04-21
Attending: EMERGENCY MEDICINE
Payer: COMMERCIAL

## 2023-04-21 ENCOUNTER — HOSPITAL ENCOUNTER (EMERGENCY)
Facility: MEDICAL CENTER | Age: 53
End: 2023-04-21
Attending: EMERGENCY MEDICINE
Payer: COMMERCIAL

## 2023-04-21 VITALS
SYSTOLIC BLOOD PRESSURE: 141 MMHG | TEMPERATURE: 98.2 F | BODY MASS INDEX: 42.66 KG/M2 | HEART RATE: 87 BPM | HEIGHT: 72 IN | DIASTOLIC BLOOD PRESSURE: 62 MMHG | RESPIRATION RATE: 20 BRPM | WEIGHT: 315 LBS | OXYGEN SATURATION: 92 %

## 2023-04-21 DIAGNOSIS — K64.8 INTERNAL HEMORRHOID: ICD-10-CM

## 2023-04-21 DIAGNOSIS — G47.30 SLEEP APNEA, UNSPECIFIED TYPE: ICD-10-CM

## 2023-04-21 LAB
ALBUMIN SERPL BCP-MCNC: 4.6 G/DL (ref 3.2–4.9)
ALBUMIN/GLOB SERPL: 1.3 G/DL
ALP SERPL-CCNC: 56 U/L (ref 30–99)
ALT SERPL-CCNC: 35 U/L (ref 2–50)
ANION GAP SERPL CALC-SCNC: 16 MMOL/L (ref 7–16)
AST SERPL-CCNC: 40 U/L (ref 12–45)
BASOPHILS # BLD AUTO: 0.5 % (ref 0–1.8)
BASOPHILS # BLD: 0.04 K/UL (ref 0–0.12)
BILIRUB SERPL-MCNC: 0.4 MG/DL (ref 0.1–1.5)
BUN SERPL-MCNC: 20 MG/DL (ref 8–22)
CALCIUM ALBUM COR SERPL-MCNC: 8.6 MG/DL (ref 8.5–10.5)
CALCIUM SERPL-MCNC: 9.1 MG/DL (ref 8.5–10.5)
CHLORIDE SERPL-SCNC: 102 MMOL/L (ref 96–112)
CO2 SERPL-SCNC: 19 MMOL/L (ref 20–33)
CREAT SERPL-MCNC: 1.31 MG/DL (ref 0.5–1.4)
EKG IMPRESSION: NORMAL
EOSINOPHIL # BLD AUTO: 0.28 K/UL (ref 0–0.51)
EOSINOPHIL NFR BLD: 3.4 % (ref 0–6.9)
ERYTHROCYTE [DISTWIDTH] IN BLOOD BY AUTOMATED COUNT: 43.1 FL (ref 35.9–50)
GFR SERPLBLD CREATININE-BSD FMLA CKD-EPI: 65 ML/MIN/1.73 M 2
GLOBULIN SER CALC-MCNC: 3.5 G/DL (ref 1.9–3.5)
GLUCOSE SERPL-MCNC: 97 MG/DL (ref 65–99)
HCT VFR BLD AUTO: 48.4 % (ref 42–52)
HGB BLD-MCNC: 16.7 G/DL (ref 14–18)
IMM GRANULOCYTES # BLD AUTO: 0.06 K/UL (ref 0–0.11)
IMM GRANULOCYTES NFR BLD AUTO: 0.7 % (ref 0–0.9)
LIPASE SERPL-CCNC: 95 U/L (ref 11–82)
LYMPHOCYTES # BLD AUTO: 3.38 K/UL (ref 1–4.8)
LYMPHOCYTES NFR BLD: 41.3 % (ref 22–41)
MCH RBC QN AUTO: 29.7 PG (ref 27–33)
MCHC RBC AUTO-ENTMCNC: 34.5 G/DL (ref 33.7–35.3)
MCV RBC AUTO: 86.1 FL (ref 81.4–97.8)
MONOCYTES # BLD AUTO: 0.63 K/UL (ref 0–0.85)
MONOCYTES NFR BLD AUTO: 7.7 % (ref 0–13.4)
NEUTROPHILS # BLD AUTO: 3.79 K/UL (ref 1.82–7.42)
NEUTROPHILS NFR BLD: 46.4 % (ref 44–72)
NRBC # BLD AUTO: 0 K/UL
NRBC BLD-RTO: 0 /100 WBC
NT-PROBNP SERPL IA-MCNC: <5 PG/ML (ref 0–125)
PLATELET # BLD AUTO: 194 K/UL (ref 164–446)
PMV BLD AUTO: 9.8 FL (ref 9–12.9)
POTASSIUM SERPL-SCNC: 3.9 MMOL/L (ref 3.6–5.5)
PROT SERPL-MCNC: 8.1 G/DL (ref 6–8.2)
RBC # BLD AUTO: 5.62 M/UL (ref 4.7–6.1)
SODIUM SERPL-SCNC: 137 MMOL/L (ref 135–145)
TROPONIN T SERPL-MCNC: 8 NG/L (ref 6–19)
WBC # BLD AUTO: 8.2 K/UL (ref 4.8–10.8)

## 2023-04-21 PROCEDURE — 84484 ASSAY OF TROPONIN QUANT: CPT

## 2023-04-21 PROCEDURE — 93005 ELECTROCARDIOGRAM TRACING: CPT | Performed by: EMERGENCY MEDICINE

## 2023-04-21 PROCEDURE — 83690 ASSAY OF LIPASE: CPT

## 2023-04-21 PROCEDURE — 46600 DIAGNOSTIC ANOSCOPY SPX: CPT

## 2023-04-21 PROCEDURE — 36415 COLL VENOUS BLD VENIPUNCTURE: CPT

## 2023-04-21 PROCEDURE — 99283 EMERGENCY DEPT VISIT LOW MDM: CPT

## 2023-04-21 PROCEDURE — 80053 COMPREHEN METABOLIC PANEL: CPT

## 2023-04-21 PROCEDURE — 85025 COMPLETE CBC W/AUTO DIFF WBC: CPT

## 2023-04-21 PROCEDURE — 83880 ASSAY OF NATRIURETIC PEPTIDE: CPT

## 2023-04-21 PROCEDURE — 71046 X-RAY EXAM CHEST 2 VIEWS: CPT

## 2023-04-22 NOTE — ED PROVIDER NOTES
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 Pt states bright red blood is frequently in stool and underwear.        EXTERNAL RECORDS REVIEWED  Outpatient Notes was last seen as an outpatient in October of this last year for cervical radiculopathy  Prior blood work from 2021 does not reveal any evidence of renal failure nor anemia    HPI/ROS  LIMITATION TO HISTORY   Select: : None  OUTSIDE HISTORIAN(S):  none    Mabel Rivera is a 53 y.o. male who presents to the emergency department with multiple complaints.  The patient basically says 1 he is on his butt all day at work he sits and when he finally gets up he just feels, aching in his feet.  The second complaint is that whenever he eats seafood especially oysters he noticed like an itching feeling in his stomach and sometimes he feels a little short of breath after which he will notice a little bit of blood in his stool.  At other times he states he just has hard stools and notices blood in his stools.  He states when he was younger he was diagnosed with hemorrhoids and wants to make sure that some is going on.  He has no rectal pain on examination.  Otherwise his wife states that yes he also snores significantly at night and he is looking for primary care evaluation.    PAST MEDICAL HISTORY       SURGICAL HISTORY  patient denies any surgical history    FAMILY HISTORY  Family History   Problem Relation Age of Onset    Asthma Mother     Asthma Sister     Cancer Brother         1/2  brother w lung cancer       SOCIAL HISTORY  Social History     Tobacco Use    Smoking status: Some Days     Types: Cigarettes    Smokeless tobacco: Never   Vaping Use    Vaping Use: Never used   Substance and Sexual Activity    Alcohol use: Yes    Drug " use: Not Currently    Sexual activity: Yes       CURRENT MEDICATIONS  Home Medications       Reviewed by Trey Milton R.N. (Registered Nurse) on 23 at 1729  Med List Status: Not Addressed     Medication Last Dose Status   IBUPROFEN PO  Active                    ALLERGIES  Allergies   Allergen Reactions    Banana     Cantaloupe     Watermelon [Citrullus Vulgaris]        PHYSICAL EXAM  VITAL SIGNS: BP (!) 144/106   Pulse 95   Temp 36.9 °C (98.4 °F) (Temporal)   Resp 16   Ht 1.829 m (6')   Wt (!) 147 kg (323 lb 10.2 oz)   SpO2 95%   BMI 43.89 kg/m²    Pulse Ox Interpretation:   Pulse Ox is normal   Constitutional: Alert in no apparent distress.  Large obese male  HENT: Normocephalic atraumatic, MMM  Eyes: PER, Conjunctiva normal, Non-icteric.   Cardiovascular: Regular rate and rhythm, no murmurs.   Thorax & Lungs: Normal breath sounds, No respiratory distress, No wheezing, No chest tenderness.   Abdomen: Bowel sounds normal, Soft, No tenderness, No pulsatile masses. No peritoneal signs.  Skin: Warm, Dry, No erythema, No rash.   Back: No bony tenderness, No CVA tenderness.   Extremities/MSK: Intact equal distal pulses, No edema, No tenderness, No cyanosis, no major deformities noted  Neurologic: Alert and oriented x3, No focal deficits noted.       DIAGNOSTIC STUDIES / PROCEDURES  EKG  I have independently interpreted this EKG  Results for orders placed or performed during the hospital encounter of 23   EKG   Result Value Ref Range    Report       [facility] Emergency Dept.    Test Date:  2023  Pt Name:    ELISEO MASTERS                 Department: ER  MRN:        [id number]                      Room:       [facility]  Gender:     Male                         Technician: [id number]  :        1970                   Requested By:ALICE VALDES  Order #:    [id number]                    Reading MD: Alice Valdes MD    Measurements  Intervals                                 Axis  Rate:       87                           P:          49  ND:         185                          QRS:        -25  QRSD:       98                           T:          46  QT:         383  QTc:        461    Interpretive Statements  Sinus rhythm rate 87 no ST elevations or depressions no abnormal T wave  inversions or Q waves normal intervals normal axis  Compared to ECG 08/06/2020 09:48:47  no sig change  Electronically Signed On 4- 21:38:17 PDT by Alice Aldridge MD           LABS  Labs Reviewed   CBC WITH DIFFERENTIAL - Abnormal; Notable for the following components:       Result Value    Lymphocytes 41.30 (*)     All other components within normal limits    Narrative:     Biotin intake of greater than 5 mg per day may interfere with  troponin levels, causing false low values.   COMP METABOLIC PANEL - Abnormal; Notable for the following components:    Co2 19 (*)     All other components within normal limits    Narrative:     Biotin intake of greater than 5 mg per day may interfere with  troponin levels, causing false low values.   LIPASE - Abnormal; Notable for the following components:    Lipase 95 (*)     All other components within normal limits    Narrative:     Biotin intake of greater than 5 mg per day may interfere with  troponin levels, causing false low values.   TROPONIN    Narrative:     Biotin intake of greater than 5 mg per day may interfere with  troponin levels, causing false low values.   PROBRAIN NATRIURETIC PEPTIDE, NT    Narrative:     Biotin intake of greater than 5 mg per day may interfere with  troponin levels, causing false low values.   CORRECTED CALCIUM    Narrative:     Biotin intake of greater than 5 mg per day may interfere with  troponin levels, causing false low values.   ESTIMATED GFR    Narrative:     Biotin intake of greater than 5 mg per day may interfere with  troponin levels, causing false low values.         RADIOLOGY  I have independently interpreted the  diagnostic imaging associated with this visit and am waiting the final reading from the radiologist.   My preliminary interpretation is as follows:     Chest x-ray without cardiomegaly or effusion    Radiologist interpretation:     DX-CHEST-2 VIEWS   Final Result      No acute cardiopulmonary abnormality.              COURSE & MEDICAL DECISION MAKING    ED Observation Status? Yes; I am placing the patient in to an observation status due to a diagnostic uncertainty as well as therapeutic intensity. Patient placed in observation status at 6:20 PM, 4/21/2023.     Observation plan is as follows: Labs imaging EKG chest x-ray    Upon Reevaluation, the patient's condition has: Improved; and will be discharged.    Patient discharged from ED Observation status at 8:10 PM (Time) 04/21/23  (Date).     INITIAL ASSESSMENT, COURSE AND PLAN  Care Narrative: Patient presents emerged part with multiple complaints.  In terms of the itching and may be blood in his stool and shortness of breath when he ate seafood we discussed the possibility that he is got an allergy and at this time I would like him to cut any type of seafood out of his diet for at least a month.  He is amenable to this he does snore and wife says maybe he gas little bit at night and so I suspect he is probably got sleep apnea specially based on his body habitus.  This was confirmed with the patient while sleeping desaturated here in the emergency department.  His wife states that exactly what he does at home he normally sleeps.  We had a long discussion this may cause cardiac injury in the future and is not having chest pain currently but he does state when he goes up 3 flights of stairs he gets short of breath which is why proBNP and troponin were ordered.  EKG is unremarkable and unchanged.  In terms of the rectal bleeding evaluate for internal or external hemorrhoid with anoscopy.    Anoscopy Procedure  Indication: Rectal bleeding    Procedure: The patient was  placed in the right lateral decubitus position.  External inspection of the rectum and perianal area revealed a normal anus.  A digital rectal exam was not performed. The anoscope was gently inserted and the bowel was inspected.  Visualization was good.  Mucosa was normal.  Anoscopy revealed an internal hemorrhoid at the 9 o'clock position.    The patient tolerated the procedure well.    Complication: None   DISPOSITION AND DISCUSSIONS  8:10 PM.  Laboratory and Alysis pretty unremarkable.  Mildly acidotic lipase mildly elevated but is not complaining of epigastric abdominal pain.  May have an allergy to seafood which is going to avoid that for at least a month.  The rectal bleeding is caused by an internal hemorrhoid and we discussed diet changes.  In terms of the hypoxia with sleep he states has been going on for years and years and I have referred him [facility] and for possible sleep apnea study.  While ambulating he is not hypoxic there is no signs of cardiac failure at this point nor diabetes although he does have some skin finding changes in the neck consistent with increased melanin.  But otherwise at this point we discussed diet changes primary care follow-up avoidance of certain diets and return precautions.  He understands feels comfortable going home          ADDITIONAL PROBLEM LIST  Shortness of breath with ambulation with negative cardiac work-up  Elevated blood pressure  Obesity  Likely sleep apnea  Internal hemorrhoid  Likely shellfish allergy    I have discussed management of the patient with the following physicians and ANIA's:  none    Discussion of management with other QHP or appropriate source(s): None     Escalation of care considered, and ultimately not performed:acute inpatient care management, however at this time, the patient is most appropriate for outpatient management    Barriers to care at this time, including but not limited to: Patient does not have established PCP.      Decision tools and prescription drugs considered including, but not limited to: HEART Score 1 .    The patient will return for new or worsening symptoms and is stable at the time of discharge.    The patient is referred to a primary physician for blood pressure management, diabetic screening, and for all other preventative health concerns.    DISPOSITION:  Patient will be discharged home in stable condition.    FOLLOW UP:  [facility], Emergency Dept  [address] [address] [phone]    If symptoms worsen    You will receive a phone call for primary care follow up            OUTPATIENT MEDICATIONS:  Discharge Medication List as of 4/21/2023  8:25 PM        '    FINAL DIAGNOSIS  1. Internal hemorrhoid    2. Sleep apnea, unspecified type           Electronically signed by: Alice Aldridge M.D., 4/21/2023 6:19 PM

## 2023-04-22 NOTE — DISCHARGE INSTRUCTIONS
Your blood pressure is high today.  This requires followup by a primary care doctor.  Please keep a log of your blood pressures if possible to take to your doctor appointment.  Try to increase the amount of exercise you do in your day to day living, and eliminate sodas and other sweetened beverages from your diet.      Please eliminate all seafood from your diet for the next month and see how you are feeling

## 2023-04-22 NOTE — ED TRIAGE NOTES
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 Pt states bright red blood is frequently in stool and underwear.      Pt ambulatory to triage for above complaint.      Pt is alert/oriented and follows commands. Pt speaking in full sentences and responds appropriately to questions. No acute distress noted in triage and respirations are even and unlabored.     Pt placed in lobby and educated on triage process. Pt encouraged to alert staff for any changes in condition.    "

## 2023-04-24 ENCOUNTER — PATIENT OUTREACH (OUTPATIENT)
Dept: SCHEDULING | Facility: IMAGING CENTER | Age: 53
End: 2023-04-24
Payer: COMMERCIAL

## 2023-05-03 ENCOUNTER — TELEPHONE (OUTPATIENT)
Dept: HEALTH INFORMATION MANAGEMENT | Facility: OTHER | Age: 53
End: 2023-05-03
Payer: COMMERCIAL

## 2023-08-03 ENCOUNTER — OFFICE VISIT (OUTPATIENT)
Dept: URGENT CARE | Facility: PHYSICIAN GROUP | Age: 53
End: 2023-08-03
Payer: COMMERCIAL

## 2023-08-03 ENCOUNTER — HOSPITAL ENCOUNTER (OUTPATIENT)
Dept: RADIOLOGY | Facility: MEDICAL CENTER | Age: 53
End: 2023-08-03
Attending: FAMILY MEDICINE
Payer: COMMERCIAL

## 2023-08-03 VITALS
RESPIRATION RATE: 16 BRPM | SYSTOLIC BLOOD PRESSURE: 122 MMHG | WEIGHT: 310 LBS | BODY MASS INDEX: 43.4 KG/M2 | HEART RATE: 76 BPM | TEMPERATURE: 97.8 F | HEIGHT: 71 IN | DIASTOLIC BLOOD PRESSURE: 80 MMHG | OXYGEN SATURATION: 96 %

## 2023-08-03 DIAGNOSIS — R06.02 SHORTNESS OF BREATH: ICD-10-CM

## 2023-08-03 DIAGNOSIS — R07.9 CHEST PAIN, UNSPECIFIED TYPE: ICD-10-CM

## 2023-08-03 DIAGNOSIS — R05.1 ACUTE COUGH: ICD-10-CM

## 2023-08-03 PROCEDURE — 3079F DIAST BP 80-89 MM HG: CPT | Performed by: FAMILY MEDICINE

## 2023-08-03 PROCEDURE — 3074F SYST BP LT 130 MM HG: CPT | Performed by: FAMILY MEDICINE

## 2023-08-03 PROCEDURE — 93000 ELECTROCARDIOGRAM COMPLETE: CPT | Performed by: FAMILY MEDICINE

## 2023-08-03 PROCEDURE — 71046 X-RAY EXAM CHEST 2 VIEWS: CPT

## 2023-08-03 PROCEDURE — 99214 OFFICE O/P EST MOD 30 MIN: CPT | Performed by: FAMILY MEDICINE

## 2023-08-03 RX ORDER — ALBUTEROL SULFATE 90 UG/1
2 AEROSOL, METERED RESPIRATORY (INHALATION) EVERY 4 HOURS PRN
Qty: 1 EACH | Refills: 0 | Status: SHIPPED | OUTPATIENT
Start: 2023-08-03

## 2023-08-03 RX ORDER — DEXTROMETHORPHAN HYDROBROMIDE AND PROMETHAZINE HYDROCHLORIDE 15; 6.25 MG/5ML; MG/5ML
5 SYRUP ORAL 4 TIMES DAILY PRN
Qty: 120 ML | Refills: 0 | Status: SHIPPED | OUTPATIENT
Start: 2023-08-03

## 2023-08-03 ASSESSMENT — ENCOUNTER SYMPTOMS
VOMITING: 0
MYALGIAS: 0
NAUSEA: 0
WEIGHT LOSS: 0
EYE DISCHARGE: 0
EYE REDNESS: 0

## 2023-08-03 ASSESSMENT — FIBROSIS 4 INDEX: FIB4 SCORE: 1.88

## 2023-08-03 NOTE — PROGRESS NOTES
"Subjective     Mabel Rivera is a 53 y.o. male who presents with Shortness of Breath (X1mo, pt states he can't take a deep breath. )            Years of intermittent left upper CP. 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 Not exertional. 1 month productive cough with drinking cold fluids.  Intermittent shortness of breath and wheezing. This has happened in previous summers.  FH asthma.  No PMH diagnosed asthma.  No fever. No limb swelling.  Never smoker.  No PMH DM or HTN.  No other aggravating or alleviating factors.        Review of Systems   Constitutional:  Negative for malaise/fatigue and weight loss.   Eyes:  Negative for discharge and redness.   Gastrointestinal:  Negative for nausea and vomiting.   Musculoskeletal:  Negative for joint pain and myalgias.   Skin:  Negative for itching and rash.              Objective     /80   Pulse 76   Temp 36.6 °C (97.8 °F) (Temporal)   Resp 16   Ht 1.803 m (5' 11\")   Wt (!) 141 kg (310 lb)   SpO2 96%   BMI 43.24 kg/m²      Physical Exam  Constitutional:       General: He is not in acute distress.     Appearance: He is well-developed.   HENT:      Head: Normocephalic and atraumatic.      Mouth/Throat:      Mouth: Mucous membranes are moist.      Pharynx: No posterior oropharyngeal erythema.   Eyes:      Conjunctiva/sclera: Conjunctivae normal.   Cardiovascular:      Rate and Rhythm: Normal rate and regular rhythm.      Heart sounds: Normal heart sounds. No murmur heard.  Pulmonary:      Effort: Pulmonary effort is normal.      Breath sounds: Normal breath sounds. No wheezing.   Chest:       Musculoskeletal:      Right lower leg: No edema.      Left lower leg: No edema.   Skin:     General: Skin is warm and dry.      Findings: No rash.   Neurological:      Mental Status: He is alert.                             Assessment & Plan         EKG: NSR rate:97 , normal axis, normal intervals, no " evidence of ischemia or hypertrophy.    CXR: no acute cardiopulmonary process per radiology    1. Chest pain, unspecified type  EKG - Clinic Performed    DX-CHEST-2 VIEWS      2. Shortness of breath  albuterol 108 (90 Base) MCG/ACT Aero Soln inhalation aerosol      3. Acute cough  promethazine-dextromethorphan (PROMETHAZINE-DM) 6.25-15 MG/5ML syrup        Differential diagnosis, natural history, supportive care, and indications for immediate follow-up were discussed.     This is a chronic intermittent problem.  Currently he is asymptomatic.  Very low probability ACS, PE, dissection.      Recommend follow-up with primary care.    Given FH asthma and intermittent I will Rx albuterol.

## 2023-08-03 NOTE — LETTER
August 3, 2023         Patient: Mabel Rivera   YOB: 1970   Date of Visit: 8/3/2023           To Whom it May Concern:    Mabel Rivera was seen in my clinic on 8/3/2023. Please excuse from work today.      Sincerely,           Mario Brown M.D.  Electronically Signed

## 2023-08-14 ENCOUNTER — HOSPITAL ENCOUNTER (EMERGENCY)
Facility: MEDICAL CENTER | Age: 53
End: 2023-08-14
Attending: EMERGENCY MEDICINE
Payer: COMMERCIAL

## 2023-08-14 VITALS
HEART RATE: 87 BPM | HEIGHT: 77 IN | RESPIRATION RATE: 18 BRPM | SYSTOLIC BLOOD PRESSURE: 175 MMHG | DIASTOLIC BLOOD PRESSURE: 112 MMHG | OXYGEN SATURATION: 95 % | TEMPERATURE: 97.7 F | WEIGHT: 315 LBS | BODY MASS INDEX: 37.19 KG/M2

## 2023-08-14 DIAGNOSIS — S61.215A LACERATION OF LEFT RING FINGER WITHOUT FOREIGN BODY WITHOUT DAMAGE TO NAIL, INITIAL ENCOUNTER: Primary | ICD-10-CM

## 2023-08-14 PROCEDURE — 304999 HCHG REPAIR-SIMPLE/INTERMED LEVEL 1

## 2023-08-14 PROCEDURE — 90715 TDAP VACCINE 7 YRS/> IM: CPT | Performed by: EMERGENCY MEDICINE

## 2023-08-14 PROCEDURE — 700111 HCHG RX REV CODE 636 W/ 250 OVERRIDE (IP): Performed by: EMERGENCY MEDICINE

## 2023-08-14 PROCEDURE — 303747 HCHG EXTRA SUTURE

## 2023-08-14 PROCEDURE — 90471 IMMUNIZATION ADMIN: CPT

## 2023-08-14 PROCEDURE — 99282 EMERGENCY DEPT VISIT SF MDM: CPT

## 2023-08-14 PROCEDURE — 304217 HCHG IRRIGATION SYSTEM

## 2023-08-14 RX ADMIN — CLOSTRIDIUM TETANI TOXOID ANTIGEN (FORMALDEHYDE INACTIVATED), CORYNEBACTERIUM DIPHTHERIAE TOXOID ANTIGEN (FORMALDEHYDE INACTIVATED), BORDETELLA PERTUSSIS TOXOID ANTIGEN (GLUTARALDEHYDE INACTIVATED), BORDETELLA PERTUSSIS FILAMENTOUS HEMAGGLUTININ ANTIGEN (FORMALDEHYDE INACTIVATED), BORDETELLA PERTUSSIS PERTACTIN ANTIGEN, AND BORDETELLA PERTUSSIS FIMBRIAE 2/3 ANTIGEN 0.5 ML: 5; 2; 2.5; 5; 3; 5 INJECTION, SUSPENSION INTRAMUSCULAR at 22:53

## 2023-08-14 ASSESSMENT — PAIN DESCRIPTION - PAIN TYPE: TYPE: ACUTE PAIN

## 2023-08-14 ASSESSMENT — FIBROSIS 4 INDEX: FIB4 SCORE: 1.88

## 2023-08-15 NOTE — ED PROVIDER NOTES
ED Provider Note    Scribed for Akin Staton by Kiana Ovalle. 8/14/2023  10:25 PM    Primary care provider: Homer Rondon M.D.  Means of arrival: Walk-In  History obtained from: Patient  History limited by: None    CHIEF COMPLAINT  Chief Complaint   Patient presents with    Laceration     Left ring finger, bleeding controlled with bandage.        EXTERNAL RECORDS REVIEWED  Review of records show no relevant records to review.    HPI/ROS  LIMITATION TO HISTORY   Select: : None  OUTSIDE HISTORIAN(S):  None    HPI  Mabel Rivera is a 53 y.o. male who presents to the Emergency Department for a laceration to his left ring finger onset prior to arrival. The patient reports that when he was throwing away a metal can he accidentally cut his finger. He notes this was about 3 hours ago now. He notes he was able to control the bleeding and then came here. He notes he is not sure if he is up to date on his tetanus vaccine. He notes he can still move the finger. He laly any other injury or symptoms. There are no known alleviating or exacerbating factors.      REVIEW OF SYSTEMS  As above, all other systems reviewed and are negative.   See HPI for further details.     PAST MEDICAL HISTORY   None noted  SURGICAL HISTORY  patient denies any surgical history  SOCIAL HISTORY  Social History     Tobacco Use    Smoking status: Some Days     Types: Cigarettes    Smokeless tobacco: Never   Vaping Use    Vaping Use: Never used   Substance Use Topics    Alcohol use: Yes    Drug use: Not Currently      Social History     Substance and Sexual Activity   Drug Use Not Currently     FAMILY HISTORY  Family History   Problem Relation Age of Onset    Asthma Mother     Asthma Sister     Cancer Brother         1/2  brother w lung cancer     CURRENT MEDICATIONS  Home Medications       Reviewed by Cady Boles R.N. (Registered Nurse) on 08/14/23 at 2153  Med List Status: Partial     Medication Last Dose Status   albuterol 108 (90  "Base) MCG/ACT Aero Soln inhalation aerosol  Active   IBUPROFEN PO  Active   promethazine-dextromethorphan (PROMETHAZINE-DM) 6.25-15 MG/5ML syrup  Active                  ALLERGIES  Allergies   Allergen Reactions    Banana     Cantaloupe     Watermelon [Citrullus Vulgaris]        PHYSICAL EXAM    VITAL SIGNS:   Vitals:    08/14/23 2139 08/14/23 2149   BP: (!) 175/112    Pulse: 87    Resp: 18    Temp: 36.5 °C (97.7 °F)    TempSrc: Temporal    SpO2: 95%    Weight:  (!) 150 kg (331 lb 9.2 oz)   Height:  1.956 m (6' 5\")     Laceration Repair Procedure Note    Indication: Laceration  Procedure: The patient was placed in the appropriate position and anesthesia around the laceration was obtained by infiltration using 1% Lidocaine without epinephrine. The area was then irrigated with normal saline and the skin adjacent to the wound was cleansed with Betadine. The laceration was closed with 4-0 Ethilon using interrupted sutures. A total of 4 sutures were placed. The wound area was then dressed with a sterile dressing.    Total repaired wound length: 2 cm.     Vitals: My interpretation: normotensive , not tachycardic, afebrile, not hypoxic    Reinterpretation of vitals: unchanged    PE:   Gen: sitting comfortably, speaking clearly, appears in no acute distress   ENT: Mucous membranes moist, posterior pharynx clear, uvula midline, nares patent bilaterally   Neck: Supple, FROM  Pulmonary: Lungs are clear to auscultation bilaterally. No tachypnea  CV:  RRR, no murmur appreciated, pulses 2+ in both upper and lower extremities  Abdomen: soft, NT/ND; no rebound/guarding  : no CVA or suprapubic tenderness   Neuro: A&Ox4 (person, place, time, situation), speech fluent, gait steady, no focal deficits appreciated  Skin: No rash or lesions.  No pallor or jaundice.  No cyanosis.  Warm and dry.   Extremity: 2 cm laceration to the left ring finger on the ventral aspect between the PIP and DIP joints, full flexion intact against resistance "     COURSE & MEDICAL DECISION MAKING  Nursing notes, VS, PMSFHx, labs, imaging, EKG reviewed in chart.    ED Observation Status? No; Patient does not meet criteria for ED Observation.     MDM: 10:25 PM Mabel Rivera is a 53 y.o. male who presented with simple finger laceration sustained while opening a tin can tonight.  Laceration is between PIP and DIP on the left ring finger, full flexion extension is intact on examination.  He is not up-to-date on his tetanus.  Laceration is deep but there is no neurovascular injury on my detailed exam after anesthetization with lidocaine, see procedure note.  Wound was closed with 4 simple interrupted sutures of 4-0 Ethilon, see procedure note.  Patient tolerated well.  He was given a Tdap before leaving.  Given strict return precautions and outpatient follow-up for suture removal in 10 to 14 days.  Return precautions were discussed and verbalized understanding and is amenable.    10:35 PM Laceration repair procedure done at this time as noted above      ADDITIONAL PROBLEM LIST AND DISPOSITION    I have discussed management of the patient with the following physicians and ANIA's:  None    Discussion of management with other QHP or appropriate source(s): None     Escalation of care considered, and ultimately not performed:IV fluids, blood analysis, and diagnostic imaging    Barriers to care at this time, including but not limited to:  None .     Decision tools and prescription drugs considered including, but not limited to:  None .    The patient will return for new or worsening symptoms and is stable at the time of discharge.    The patient is referred to a primary physician for blood pressure management, diabetic screening, and for all other preventative health concerns.    DISPOSITION:  Patient will be discharged home in stable condition.    FOLLOW UP:  Homer Rondon M.D.  [address] [place] [address] [facility] [id number] [phone]    In 2 weeks  As needed, If symptoms worsen,  For suture removal, For wound re-check      OUTPATIENT MEDICATIONS:  New Prescriptions    No medications on file     FINAL IMPRESSION  1. Laceration of left ring finger without foreign body without damage to nail, initial encounter Acute      2. Laceration repair procedure as noted above     Kiana HEART (Scribe), am scribing for, and in the presence of, Akin Staton.    Electronically signed by: Kiana Ovalle (Scribe), 8/14/2023    IAkin personally performed the services described in this documentation, as scribed by Kiana Ovalle in my presence, and it is both accurate and complete.    The note accurately reflects work and decisions made by me.  Akin Staton  8/14/2023  10:53 PM

## 2023-08-15 NOTE — ED NOTES
Taken patient from triage waiting room, ambulatory with steady gait, alert/ oriented x 4.Verified patient identification.  Assumed patient care.   Placed on patient room.   Given the call light and instructed to call for any assistance needed/ or concerns.   Bed on lowest position, side rails up, breaks locked. Awaiting for ERP.

## 2023-08-15 NOTE — ED TRIAGE NOTES
Chief Complaint   Patient presents with    Laceration     Left ring finger, bleeding controlled with bandage.       Pt ambulate to triage with above complaint. Pt cut finger on coconut can.

## 2023-08-15 NOTE — DISCHARGE INSTRUCTIONS
Please keep the laceration covered for the first 24 hours to allow to heal. After that you can wash gently with warm soapy water. The stitches should come out in 10 to 14 days and you can see your PCP, an urgent care or return to the emergency department for this. Signs of infection include pus, swelling, warmth or redness from the wound and if any of these things occur or you develop a fever, you should return the ED for further evaluation and treatment. Thank you for coming in today.

## 2024-06-01 ENCOUNTER — HOSPITAL ENCOUNTER (EMERGENCY)
Facility: MEDICAL CENTER | Age: 54
End: 2024-06-01
Attending: EMERGENCY MEDICINE
Payer: COMMERCIAL

## 2024-06-01 VITALS
DIASTOLIC BLOOD PRESSURE: 85 MMHG | WEIGHT: 315 LBS | BODY MASS INDEX: 38.98 KG/M2 | SYSTOLIC BLOOD PRESSURE: 140 MMHG | RESPIRATION RATE: 18 BRPM | TEMPERATURE: 97.4 F | HEART RATE: 80 BPM | OXYGEN SATURATION: 98 %

## 2024-06-01 DIAGNOSIS — M54.2 NECK PAIN: ICD-10-CM

## 2024-06-01 DIAGNOSIS — M62.838 MUSCLE SPASMS OF NECK: ICD-10-CM

## 2024-06-01 PROCEDURE — 99283 EMERGENCY DEPT VISIT LOW MDM: CPT

## 2024-06-01 RX ORDER — CYCLOBENZAPRINE HCL 10 MG
10 TABLET ORAL 3 TIMES DAILY PRN
Qty: 30 TABLET | Refills: 0 | Status: SHIPPED | OUTPATIENT
Start: 2024-06-01

## 2024-06-01 ASSESSMENT — FIBROSIS 4 INDEX: FIB4 SCORE: 1.92

## 2024-06-01 NOTE — ED NOTES
DC instructions reviewed with pt. Pt verbalized understanding. Pt ambulated to [facility] with steady gait.

## 2024-06-01 NOTE — ED TRIAGE NOTES
"Chief Complaint   Patient presents with    Neck Pain     Reports that he developed left neck pain last night.     Pt also reports generalized back pain. States that he would like \"to get it checked.\" Ambulatory with steady gait. Denies injury.  BP (!) 142/91   Pulse 84   Temp 36.4 °C (97.5 °F) (Temporal)   Resp 16   Wt (!) 149 kg (328 lb 11.3 oz)   SpO2 94%   Pt informed of wait times. Educated on triage process.  Asked to return to triage RN for any new or worsening of symptoms. Thanked for patience.      "

## 2024-06-01 NOTE — Clinical Note
Mabel Rivera was seen and treated in our emergency department on 6/1/2024.  He may return to work on 06/02/2024.       If you have any questions or concerns, please don't hesitate to call.      Cali Mcfadden M.D.

## 2024-06-01 NOTE — ED PROVIDER NOTES
ED Provider Note    CHIEF COMPLAINT  Chief Complaint   Patient presents with    Neck Pain     Reports that he developed left neck pain last night.       EXTERNAL RECORDS REVIEWED  Outpatient Notes from urgent care August 2023 for chest pain    HPI/ROS  LIMITATION TO HISTORY   Select: : None  OUTSIDE HISTORIAN(S):  none    Mabel Rivera is a 54 y.o. male who presents with left-sided neck pain.  Patient reports that after waking up from work, he noticed pain to the left side of his neck.  He has had this happen before 3 years ago and why although seemed worse at that time.  It is more of a tightness, he reports no midline pain, no headaches, no focal weakness numbness or tingling.  No chest pain or shortness of breath.  No fevers chills cough or congestion    PAST MEDICAL HISTORY       SURGICAL HISTORY  patient denies any surgical history    FAMILY HISTORY  Family History   Problem Relation Age of Onset    Asthma Mother     Asthma Sister     Cancer Brother         1/2  brother w lung cancer       SOCIAL HISTORY  Social History     Tobacco Use    Smoking status: Some Days     Types: Cigarettes    Smokeless tobacco: Never   Vaping Use    Vaping status: Never Used   Substance and Sexual Activity    Alcohol use: Yes    Drug use: Not Currently    Sexual activity: Yes       CURRENT MEDICATIONS  Home Medications       Reviewed by Kailyn Quintana R.N. (Registered Nurse) on 06/01/24 at 1054  Med List Status: Partial     Medication Last Dose Status   albuterol 108 (90 Base) MCG/ACT Aero Soln inhalation aerosol  Active   IBUPROFEN PO  Active   promethazine-dextromethorphan (PROMETHAZINE-DM) 6.25-15 MG/5ML syrup  Active                    ALLERGIES  Allergies   Allergen Reactions    Banana     Cantaloupe     Watermelon [Citrullus Vulgaris]        PHYSICAL EXAM  VITAL SIGNS: BP (!) 142/91   Pulse 84   Temp 36.4 °C (97.5 °F) (Temporal)   Resp 16   Wt (!) 149 kg (328 lb 11.3 oz)   SpO2 94%   BMI 38.98 kg/m²       Pulse ox interpretation: I interpret this pulse ox as normal.  Constitutional: Alert in no apparent distress.  HENT: Normocephalic, Atraumatic, Bilateral external ears normal. Nose normal.   NECK: There is tenderness with spasm noted over the left trapezius area, no erythema, no excessive warmth, no masses felt, fluctuance induration, no midline neck tenderness or step-offs.  Full range of motion  Eyes: Pupils are equal and reactive. Conjunctiva normal, non-icteric.   Heart: Regular rate and rythm, no murmurs.    Lungs: Clear to auscultation bilaterally.  Abd: soft, NTTP, no mass, no pulsatile mass, non-distended, no rebound or guarding  Skin: Warm, Dry, No erythema, No rash.   Neurologic: Alert, strength is 5 out of 5 with bilateral upper extremities, abduction abduction flexion extension at the shoulders, bicep tricep , sensation is intact to light touch throughout the upper extremities grossly non-focal.   Psychiatric: Affect normal, Judgment normal, Mood normal, Appears appropriate                 COURSE & MEDICAL DECISION MAKING    ASSESSMENT, COURSE AND PLAN  Care Narrative: 1115  Patient is evaluated at the bedside and chart is reviewed.  At this point appears to have more muscle spasm with some tension strain to the muscle.  There is no finding of neurologic compromise or spinal compression on his exam.  No infectious symptoms or infectious findings on exam to suggest a soft tissue infection or discitis, I do not think this is referred cardiac or other given his tenderness and nature of his symptoms although these were all considered          PROBLEMS MANAGED  # Neck pain with spasm.  Will prescribe Flexeril, follow-up with primary care    DISPOSITION AND DISCUSSIONS  Barriers to care at this time, including but not limited to:  none .     Decision tools and prescription drugs considered including, but not limited to: Medication modification prescription for Flexeril .    The patient will return for  new or worsening symptoms and is stable at the time of discharge.    The patient is referred to a primary physician for blood pressure management, diabetic screening, and for all other preventative health concerns.        DISPOSITION:  Patient will be discharged home in stable condition.    FOLLOW UP:  [address] [address]  [phone]  Schedule an appointment as soon as possible for a visit         OUTPATIENT MEDICATIONS:  New Prescriptions    CYCLOBENZAPRINE (FLEXERIL) 10 MG TAB    Take 1 Tablet by mouth 3 times a day as needed for Moderate Pain or Muscle Spasms.         FINAL DIAGNOSIS  1. Muscle spasms of neck    2. Neck pain           Electronically signed by: Cali Mcfadden M.D., 6/1/2024 11:30 AM

## 2024-08-03 ENCOUNTER — HOSPITAL ENCOUNTER (EMERGENCY)
Facility: MEDICAL CENTER | Age: 54
End: 2024-08-03
Attending: EMERGENCY MEDICINE
Payer: COMMERCIAL

## 2024-08-03 VITALS
RESPIRATION RATE: 16 BRPM | HEART RATE: 98 BPM | OXYGEN SATURATION: 94 % | HEIGHT: 72 IN | SYSTOLIC BLOOD PRESSURE: 154 MMHG | DIASTOLIC BLOOD PRESSURE: 73 MMHG | BODY MASS INDEX: 42.66 KG/M2 | WEIGHT: 315 LBS | TEMPERATURE: 98.4 F

## 2024-08-03 DIAGNOSIS — R51.9 NONINTRACTABLE HEADACHE, UNSPECIFIED CHRONICITY PATTERN, UNSPECIFIED HEADACHE TYPE: ICD-10-CM

## 2024-08-03 PROCEDURE — 700105 HCHG RX REV CODE 258: Performed by: EMERGENCY MEDICINE

## 2024-08-03 PROCEDURE — 700111 HCHG RX REV CODE 636 W/ 250 OVERRIDE (IP): Mod: JZ | Performed by: EMERGENCY MEDICINE

## 2024-08-03 PROCEDURE — 96374 THER/PROPH/DIAG INJ IV PUSH: CPT

## 2024-08-03 PROCEDURE — 99284 EMERGENCY DEPT VISIT MOD MDM: CPT

## 2024-08-03 PROCEDURE — 96375 TX/PRO/DX INJ NEW DRUG ADDON: CPT

## 2024-08-03 RX ORDER — DIPHENHYDRAMINE HYDROCHLORIDE 50 MG/ML
25 INJECTION INTRAMUSCULAR; INTRAVENOUS ONCE
Status: DISCONTINUED | OUTPATIENT
Start: 2024-08-03 | End: 2024-08-03 | Stop reason: HOSPADM

## 2024-08-03 RX ORDER — PROCHLORPERAZINE EDISYLATE 5 MG/ML
10 INJECTION INTRAMUSCULAR; INTRAVENOUS ONCE
Status: COMPLETED | OUTPATIENT
Start: 2024-08-03 | End: 2024-08-03

## 2024-08-03 RX ORDER — KETOROLAC TROMETHAMINE 15 MG/ML
15 INJECTION, SOLUTION INTRAMUSCULAR; INTRAVENOUS ONCE
Status: COMPLETED | OUTPATIENT
Start: 2024-08-03 | End: 2024-08-03

## 2024-08-03 RX ORDER — SODIUM CHLORIDE 9 MG/ML
1000 INJECTION, SOLUTION INTRAVENOUS ONCE
Status: COMPLETED | OUTPATIENT
Start: 2024-08-03 | End: 2024-08-03

## 2024-08-03 RX ADMIN — SODIUM CHLORIDE 1000 ML: 9 INJECTION, SOLUTION INTRAVENOUS at 14:23

## 2024-08-03 RX ADMIN — PROCHLORPERAZINE EDISYLATE 10 MG: 5 INJECTION INTRAMUSCULAR; INTRAVENOUS at 14:25

## 2024-08-03 RX ADMIN — KETOROLAC TROMETHAMINE 15 MG: 15 INJECTION, SOLUTION INTRAMUSCULAR; INTRAVENOUS at 14:25

## 2024-08-03 ASSESSMENT — FIBROSIS 4 INDEX: FIB4 SCORE: 1.92

## 2024-08-03 NOTE — ED NOTES
Patient aware to stay on same dose /per a prabhu recheck in 4 weeks or per dr Oscar Garner discretion/please advise Pt requesting to leave, states pain has resolved. ERP aware.

## 2024-08-03 NOTE — ED TRIAGE NOTES
Chief Complaint   Patient presents with    Headache     HA x3 days. Pt reports pain started in his neck and radiating into the back of his head. 9/10 pain. Denies vision changes. -n/v.       Patient ambulatory to triage for above complaint. Patient A&Ox4, GCS 15, patient speaking in full sentences. Equal and unlabored respirations. Patient educated on triage process and encouraged to notify staff if condition worsens. Appropriate protocols ordered. Patient returned to the lobby in stable condition.

## 2024-08-03 NOTE — ED PROVIDER NOTES
ED Provider Note    CHIEF COMPLAINT  Chief Complaint   Patient presents with    Headache     HA x3 days. Pt reports pain started in his neck and radiating into the back of his head. 9/10 pain. Denies vision changes. -n/v.        EXTERNAL RECORDS REVIEWED  Inpatient Notes ED note 6/1/24 when the patient was evaluated for neck pain and diagnosed with muscle spasms of the neck.  He was discharged home with cyclobenzaprine.    HPI/ROS  LIMITATION TO HISTORY   Select: : None  OUTSIDE HISTORIAN(S):  None    Mabel Rivera is a 54 y.o. male who presents to the emergency department for evaluation of a headache.  The patient states that he started having a headache on the top of his head 2 days ago.  He denies any radiation of the pain.  He denies any changes in his vision.  He states he has taken ibuprofen for the pain and it has helped.  He denies trauma.  He denies neck or back pain.  He has not had any fevers.  He denies any focal numbness or weakness.  He denies chest pain or shortness of breath.  He has had nausea but no vomiting.    PAST MEDICAL HISTORY  None    SURGICAL HISTORY  patient denies any surgical history    FAMILY HISTORY  Family History   Problem Relation Age of Onset    Asthma Mother     Asthma Sister     Cancer Brother         1/2  brother w lung cancer       SOCIAL HISTORY  Social History     Tobacco Use    Smoking status: Some Days     Types: Cigarettes    Smokeless tobacco: Never   Vaping Use    Vaping status: Never Used   Substance and Sexual Activity    Alcohol use: Yes    Drug use: Not Currently    Sexual activity: Yes       CURRENT MEDICATIONS  Home Medications       Reviewed by Alecia Sanders R.N. (Registered Nurse) on 08/03/24 at 1339  Med List Status: Partial     Medication Last Dose Status   albuterol 108 (90 Base) MCG/ACT Aero Soln inhalation aerosol  Active   cyclobenzaprine (FLEXERIL) 10 mg Tab  Active   IBUPROFEN PO  Active   promethazine-dextromethorphan (PROMETHAZINE-DM)  6.25-15 MG/5ML syrup  Active                    ALLERGIES  Allergies   Allergen Reactions    Banana     Cantaloupe     Watermelon [Citrullus Vulgaris]        PHYSICAL EXAM  VITAL SIGNS: /85   Pulse 87   Temp 36.6 °C (97.8 °F) (Temporal)   Resp 16   Ht 1.829 m (6')   Wt (!) 154 kg (339 lb 4.6 oz)   SpO2 100%   BMI 46.02 kg/m²   Constitutional: Alert and in no apparent distress.  HENT: Normocephalic atraumatic. Bilateral external ears normal. Bilateral TM's clear. Nose normal. Mucous membranes are moist.  Eyes: Pupils are equal and reactive. Conjunctiva normal. Non-icteric sclera.   Neck: Normal range of motion without tenderness. Supple. No midline cervical spine tenderness.  No meningeal signs.  Cardiovascular: Regular rate and rhythm. No murmurs, gallops or rubs.  Thorax & Lungs: No increased work of breathing. Breath sounds are clear to auscultation bilaterally. No wheezing, rhonchi or rales.  Abdomen: Soft, nontender and nondistended. No hepatosplenomegaly.  Skin: Warm and dry. No rashes are noted.  Back: No bony tenderness, No CVA tenderness.   Extremities: 2+ peripheral pulses. Cap refill is less than 2 seconds. No edema, cyanosis, or clubbing.  Musculoskeletal: Good range of motion in all major joints. No tenderness to palpation or major deformities noted.   Neurologic: Alert and oriented x 3.  The patient moves all 4 extremities without obvious deficits. Patient has symmetry of the face, specifically with eyebrow raising and smiling. Extraocular muscles are intact. Pupils equal and reactive. Visual fields intact. Tongue is midline with no fasciculations. Soft palate is symmetrical and uvula is midline. Patient is able to resist against force with head rotation bilaterally. Patient is able to shrug shoulders. Hearing is intact bilaterally. Normal finger to nose. No pronator drift. Normal heel to toe. Sensations are grossly intact. Steady gait.     COURSE & MEDICAL DECISION MAKING    ASSESSMENT,  COURSE AND PLAN  Care Narrative: This is a 54-year-old male presenting to the emergency department for evaluation of a headache.  On initial evaluation, the patient did not appear to be in any acute distress.  Vital signs are reassuring.  Physical exam was overall reassuring.  He had a nonfocal neuroexam and his GCS was 15.  He had full range of motion of his neck with no evidence of meningeal signs or history of fevers.  I have very low clinical suspicion for meningitis or encephalitis.  He had no focal neurodeficits and I am less concerned for intracranial hemorrhage or mass.      The patient was treated with a migraine cocktail.  Upon reassessment, the patient's headache has resolved.  I do think he is stable for discharge.  A referral to a primary care physician was placed for him.  I discussed supportive measures and encouraged him to follow-up.  He will return to the ED with any worsening signs or symptoms.    The patient presents with headache without signs of CNS bleed, stroke, infection, or other serious etiology. The patient is neurologically intact. Given the extremely low risk of these diagnoses further testing and evaluation for these possibilities does not appear to be indicated at this time. The patient has been instructed to return if the symptoms worsen or change in any way.    Hydration: Based on the patient's presentation of Other migraine cocktail the patient was given IV fluids. IV Hydration was used because oral hydration was not adequate alone. Upon recheck following hydration, the patient was improved.    ADDITIONAL PROBLEMS MANAGED  Headache    DISPOSITION AND DISCUSSIONS  I have discussed management of the patient with the following physicians and ANIA's:  None    Discussion of management with other Q or appropriate source(s): None     Escalation of care considered, and ultimately not performed:acute inpatient care management, however at this time, the patient is most appropriate for  outpatient management    Barriers to care at this time, including but not limited to:  None .     Decision tools and prescription drugs considered including, but not limited to:  None .    FINAL IMPRESSION  1. Nonintractable headache, unspecified chronicity pattern, unspecified headache type      PRESCRIPTIONS  New Prescriptions    No medications on file     FOLLOW UP  [facility], Emergency Dept  [address] [facility] [address]  [phone]  Go to   As needed    -DISCHARGE-    Electronically signed by: Naila Velarde D.O., 8/3/2024 2:02 PM

## 2024-08-30 ENCOUNTER — APPOINTMENT (OUTPATIENT)
Dept: RADIOLOGY | Facility: MEDICAL CENTER | Age: 54
End: 2024-08-30
Payer: COMMERCIAL

## 2024-08-30 ENCOUNTER — HOSPITAL ENCOUNTER (EMERGENCY)
Facility: MEDICAL CENTER | Age: 54
End: 2024-08-30
Attending: EMERGENCY MEDICINE
Payer: COMMERCIAL

## 2024-08-30 VITALS
WEIGHT: 315 LBS | TEMPERATURE: 97.8 F | HEIGHT: 71 IN | DIASTOLIC BLOOD PRESSURE: 91 MMHG | SYSTOLIC BLOOD PRESSURE: 132 MMHG | RESPIRATION RATE: 14 BRPM | HEART RATE: 76 BPM | OXYGEN SATURATION: 94 % | BODY MASS INDEX: 44.1 KG/M2

## 2024-08-30 DIAGNOSIS — R05.4 COUGH SYNCOPE: ICD-10-CM

## 2024-08-30 DIAGNOSIS — R73.09 ELEVATED RANDOM BLOOD GLUCOSE LEVEL: ICD-10-CM

## 2024-08-30 DIAGNOSIS — R03.0 ELEVATED BLOOD-PRESSURE READING WITHOUT DIAGNOSIS OF HYPERTENSION: ICD-10-CM

## 2024-08-30 DIAGNOSIS — R55 COUGH SYNCOPE: ICD-10-CM

## 2024-08-30 LAB
ALBUMIN SERPL BCP-MCNC: 4.1 G/DL (ref 3.2–4.9)
ALBUMIN/GLOB SERPL: 1.4 G/DL
ALP SERPL-CCNC: 55 U/L (ref 30–99)
ALT SERPL-CCNC: 44 U/L (ref 2–50)
ANION GAP SERPL CALC-SCNC: 13 MMOL/L (ref 7–16)
APPEARANCE UR: CLEAR
AST SERPL-CCNC: 42 U/L (ref 12–45)
BASOPHILS # BLD AUTO: 0.7 % (ref 0–1.8)
BASOPHILS # BLD: 0.07 K/UL (ref 0–0.12)
BILIRUB SERPL-MCNC: 0.4 MG/DL (ref 0.1–1.5)
BILIRUB UR QL STRIP.AUTO: NEGATIVE
BUN SERPL-MCNC: 20 MG/DL (ref 8–22)
CALCIUM ALBUM COR SERPL-MCNC: 8.4 MG/DL (ref 8.5–10.5)
CALCIUM SERPL-MCNC: 8.5 MG/DL (ref 8.5–10.5)
CHLORIDE SERPL-SCNC: 99 MMOL/L (ref 96–112)
CO2 SERPL-SCNC: 22 MMOL/L (ref 20–33)
COLOR UR: YELLOW
CREAT SERPL-MCNC: 1.35 MG/DL (ref 0.5–1.4)
EKG IMPRESSION: NORMAL
EOSINOPHIL # BLD AUTO: 0.28 K/UL (ref 0–0.51)
EOSINOPHIL NFR BLD: 2.8 % (ref 0–6.9)
ERYTHROCYTE [DISTWIDTH] IN BLOOD BY AUTOMATED COUNT: 49.1 FL (ref 35.9–50)
EST. AVERAGE GLUCOSE BLD GHB EST-MCNC: 128 MG/DL
GFR SERPLBLD CREATININE-BSD FMLA CKD-EPI: 62 ML/MIN/1.73 M 2
GLOBULIN SER CALC-MCNC: 2.9 G/DL (ref 1.9–3.5)
GLUCOSE SERPL-MCNC: 147 MG/DL (ref 65–99)
GLUCOSE UR STRIP.AUTO-MCNC: NEGATIVE MG/DL
HBA1C MFR BLD: 6.1 % (ref 4–5.6)
HCT VFR BLD AUTO: 49.2 % (ref 42–52)
HGB BLD-MCNC: 16.5 G/DL (ref 14–18)
IMM GRANULOCYTES # BLD AUTO: 0.11 K/UL (ref 0–0.11)
IMM GRANULOCYTES NFR BLD AUTO: 1.1 % (ref 0–0.9)
KETONES UR STRIP.AUTO-MCNC: ABNORMAL MG/DL
LEUKOCYTE ESTERASE UR QL STRIP.AUTO: NEGATIVE
LYMPHOCYTES # BLD AUTO: 2.92 K/UL (ref 1–4.8)
LYMPHOCYTES NFR BLD: 28.7 % (ref 22–41)
MCH RBC QN AUTO: 30.9 PG (ref 27–33)
MCHC RBC AUTO-ENTMCNC: 33.5 G/DL (ref 32.3–36.5)
MCV RBC AUTO: 92.1 FL (ref 81.4–97.8)
MICRO URNS: ABNORMAL
MONOCYTES # BLD AUTO: 0.84 K/UL (ref 0–0.85)
MONOCYTES NFR BLD AUTO: 8.3 % (ref 0–13.4)
NEUTROPHILS # BLD AUTO: 5.94 K/UL (ref 1.82–7.42)
NEUTROPHILS NFR BLD: 58.4 % (ref 44–72)
NITRITE UR QL STRIP.AUTO: NEGATIVE
NRBC # BLD AUTO: 0 K/UL
NRBC BLD-RTO: 0 /100 WBC (ref 0–0.2)
PH UR STRIP.AUTO: 6 [PH] (ref 5–8)
PLATELET # BLD AUTO: 192 K/UL (ref 164–446)
PMV BLD AUTO: 10.3 FL (ref 9–12.9)
POTASSIUM SERPL-SCNC: 4.2 MMOL/L (ref 3.6–5.5)
PROT SERPL-MCNC: 7 G/DL (ref 6–8.2)
PROT UR QL STRIP: NEGATIVE MG/DL
RBC # BLD AUTO: 5.34 M/UL (ref 4.7–6.1)
RBC UR QL AUTO: NEGATIVE
SODIUM SERPL-SCNC: 134 MMOL/L (ref 135–145)
SP GR UR STRIP.AUTO: 1.02
TROPONIN T SERPL-MCNC: 7 NG/L (ref 6–19)
UROBILINOGEN UR STRIP.AUTO-MCNC: 1 MG/DL
WBC # BLD AUTO: 10.2 K/UL (ref 4.8–10.8)

## 2024-08-30 PROCEDURE — 93005 ELECTROCARDIOGRAM TRACING: CPT | Performed by: EMERGENCY MEDICINE

## 2024-08-30 PROCEDURE — 99284 EMERGENCY DEPT VISIT MOD MDM: CPT

## 2024-08-30 PROCEDURE — 83036 HEMOGLOBIN GLYCOSYLATED A1C: CPT

## 2024-08-30 PROCEDURE — 36415 COLL VENOUS BLD VENIPUNCTURE: CPT

## 2024-08-30 PROCEDURE — 81003 URINALYSIS AUTO W/O SCOPE: CPT

## 2024-08-30 PROCEDURE — 84484 ASSAY OF TROPONIN QUANT: CPT

## 2024-08-30 PROCEDURE — 71045 X-RAY EXAM CHEST 1 VIEW: CPT

## 2024-08-30 PROCEDURE — 80053 COMPREHEN METABOLIC PANEL: CPT

## 2024-08-30 PROCEDURE — 93005 ELECTROCARDIOGRAM TRACING: CPT

## 2024-08-30 PROCEDURE — 85025 COMPLETE CBC W/AUTO DIFF WBC: CPT

## 2024-08-30 ASSESSMENT — FIBROSIS 4 INDEX: FIB4 SCORE: 1.92

## 2024-08-30 NOTE — ED PROVIDER NOTES
ED Provider Note    Scribed for Johnnie Grider M.D. by Sasha Garcia. 8/30/2024  2:01 PM    Primary care provider: Homer Rondon M.D.  Means of arrival: Private vehicle    CHIEF COMPLAINT  Chief Complaint   Patient presents with    Syncope       EXTERNAL RECORDS REVIEWED  Clinic visit 08/23/24 for shortness of breath, treated with Albuterol.     MEREDITH Rivera is a 54 y.o. male who presents to the Emergency Department for evaluation of syncope onset one day ago. He reports he was eating, when he began coughing and began to feel dizziness. He notes the last thing he remembers is waking up on the floor. He states he has experienced dizziness associated with cough ting before.  He confirms experiencing syncope before approximately 10 years ago. Denies fever, headache, vomiting, hematemesis, diarrhea, blood in stool, chest pain, palpitations, or having been sick recently. He states he has felt unsafe going to work as he is still experiencing dizziness. Confirms history of leg swelling at times but not now. Denies medical history of CHF, arrhythmia, or heart disease. 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     LIMITATION TO HISTORY   None.     OUTSIDE HISTORIAN(S):  Wife was present at bedside to confirm the patient has coughing episodes with dizziness before.     REVIEW OF SYSTEMS  Pertinent positives include: Sore throat.  Pertinent negatives include: Fever, headache, vomiting, hematemesis, diarrhea, blood in stool, chest pain, palpitations, or recent sickness    PAST MEDICAL HISTORY  Past Medical History:   Diagnosis Date    Asthma        FAMILY HISTORY  Family History   Problem Relation Age of Onset    Asthma Mother     Asthma Sister     Cancer Brother         1/2  brother w lung cancer       SOCIAL HISTORY  Social History     Tobacco Use    Smoking status: Some Days     Types: Cigarettes    Smokeless tobacco: Never   Vaping Use    Vaping status: Never Used  "  Substance Use Topics    Alcohol use: Yes     Alcohol/week: 2.4 oz     Types: 4 Cans of beer per week    Drug use: Not Currently     Social History     Substance and Sexual Activity   Drug Use Not Currently       SURGICAL HISTORY  History reviewed. No pertinent surgical history.    CURRENT MEDICATIONS  No current facility-administered medications for this encounter.    Current Outpatient Medications:     cyclobenzaprine (FLEXERIL) 10 mg Tab, Take 1 Tablet by mouth 3 times a day as needed for Moderate Pain or Muscle Spasms., Disp: 30 Tablet, Rfl: 0    albuterol 108 (90 Base) MCG/ACT Aero Soln inhalation aerosol, Inhale 2 Puffs every four hours as needed for Shortness of Breath., Disp: 1 Each, Rfl: 0    promethazine-dextromethorphan (PROMETHAZINE-DM) 6.25-15 MG/5ML syrup, Take 5 mL by mouth 4 times a day as needed for Cough., Disp: 120 mL, Rfl: 0    IBUPROFEN PO, Take  by mouth., Disp: , Rfl:     ALLERGIES  Allergies   Allergen Reactions    Banana     Cantaloupe     Rao Flavor     Strawberry     Watermelon [Citrullus Vulgaris]        PHYSICAL EXAM  VITAL SIGNS: BP (!) 184/92   Pulse 82   Temp 36.3 °C (97.4 °F) (Temporal)   Resp 16   Ht 1.803 m (5' 11\")   Wt (!) 155 kg (342 lb 6 oz)   SpO2 92%   BMI 47.75 kg/m²   Reviewed and hypertensive afebrile  Constitutional: Well developed, Well nourished, Elevated BMI.  HENT: Normocephalic, atraumatic, bilateral external ears normal, No intraoral erythema, edema, exudate  Eyes: PERRLA, conjunctiva pink, no scleral icterus.   Cardiovascular: Regular rate and rhythm. No murmurs, rubs or gallops.  No dependent edema or calf tenderness  Respiratory: Lungs clear to auscultation bilaterally. No wheezes, rales, or rhonchi.  Abdominal:  Abdomen soft, non-tender, non distended. No rebound, or guarding.    Skin: No erythema, no rash. No wounds or bruising.  Genitourinary: No costovertebral angle tenderness.   Musculoskeletal: no deformities.   Neurologic: Alert & oriented x 3, " cranial nerves 2-12 intact by passive exam.  No focal deficit noted.  Psychiatric: Affect normal, Judgment normal, Mood normal.     LABS Ordered and Reviewed by Me:  Results for orders placed or performed during the hospital encounter of 08/30/24   CBC with Differential   Result Value Ref Range    WBC 10.2 4.8 - 10.8 K/uL    RBC 5.34 4.70 - 6.10 M/uL    Hemoglobin 16.5 14.0 - 18.0 g/dL    Hematocrit 49.2 42.0 - 52.0 %    MCV 92.1 81.4 - 97.8 fL    MCH 30.9 27.0 - 33.0 pg    MCHC 33.5 32.3 - 36.5 g/dL    RDW 49.1 35.9 - 50.0 fL    Platelet Count 192 164 - 446 K/uL    MPV 10.3 9.0 - 12.9 fL    Neutrophils-Polys 58.40 44.00 - 72.00 %    Lymphocytes 28.70 22.00 - 41.00 %    Monocytes 8.30 0.00 - 13.40 %    Eosinophils 2.80 0.00 - 6.90 %    Basophils 0.70 0.00 - 1.80 %    Immature Granulocytes 1.10 (H) 0.00 - 0.90 %    Nucleated RBC 0.00 0.00 - 0.20 /100 WBC    Neutrophils (Absolute) 5.94 1.82 - 7.42 K/uL    Lymphs (Absolute) 2.92 1.00 - 4.80 K/uL    Monos (Absolute) 0.84 0.00 - 0.85 K/uL    Eos (Absolute) 0.28 0.00 - 0.51 K/uL    Baso (Absolute) 0.07 0.00 - 0.12 K/uL    Immature Granulocytes (abs) 0.11 0.00 - 0.11 K/uL    NRBC (Absolute) 0.00 K/uL   Complete Metabolic Panel (CMP)   Result Value Ref Range    Sodium 134 (L) 135 - 145 mmol/L    Potassium 4.2 3.6 - 5.5 mmol/L    Chloride 99 96 - 112 mmol/L    Co2 22 20 - 33 mmol/L    Anion Gap 13.0 7.0 - 16.0    Glucose 147 (H) 65 - 99 mg/dL    Bun 20 8 - 22 mg/dL    Creatinine 1.35 0.50 - 1.40 mg/dL    Calcium 8.5 8.5 - 10.5 mg/dL    Correct Calcium 8.4 (L) 8.5 - 10.5 mg/dL    AST(SGOT) 42 12 - 45 U/L    ALT(SGPT) 44 2 - 50 U/L    Alkaline Phosphatase 55 30 - 99 U/L    Total Bilirubin 0.4 0.1 - 1.5 mg/dL    Albumin 4.1 3.2 - 4.9 g/dL    Total Protein 7.0 6.0 - 8.2 g/dL    Globulin 2.9 1.9 - 3.5 g/dL    A-G Ratio 1.4 g/dL   Troponins NOW   Result Value Ref Range    Troponin T 7 6 - 19 ng/L                 URINALYSIS    Specimen: Urine   Result Value Ref Range    Color  Yellow     Character Clear     Specific Gravity 1.025 <1.035    Ph 6.0 5.0 - 8.0    Glucose Negative Negative mg/dL    Ketones Trace (A) Negative mg/dL    Protein Negative Negative mg/dL    Bilirubin Negative Negative    Urobilinogen, Urine 1.0 Negative    Nitrite Negative Negative    Leukocyte Esterase Negative Negative    Occult Blood Negative Negative    Micro Urine Req see below        Interpretations:    Pulse Oximetry: Low normal oxygenation room air    Rhythm Strip: Interpretation by me Normal Sinus Rhythm    EKG Interpretation by me    Indication: Syncope    Rhythm: normal sinus   Rate: Normal at 85  Axis: normal  Ectopy: PAC  Conduction: normal  ST/T Waves: no acute change  Q Waves: none  R Wave progression: normal  Hypertrophy changes: Absent    Clinical Impression: Normal Sinus Rhythm w/ PAC    RADIOLOGY  I have independently interpreted the Chest X-ray associated with this visit demonstrating no cardiomegaly.  I am awaiting the final reading from the radiologist.     Final Radiology Report  DX-CHEST-PORTABLE (1 VIEW)   Final Result      No acute cardiac or pulmonary abnormalities are identified.        Radiologist interpretation have been reviewed by me.     COURSE & MEDICAL DECISION MAKING  2:01 PM - Patient seen and examined at bedside. Patient presents to the ED for evaluation of syncope onset following coughing with associated dizziness. Discussed plan of care, including obtaining labs and imaging. Patient agrees to the plan of care. Ordered for Troponins, CMP, CBC w/ diff, UA, and DX-Chest to evaluate his symptoms.  Patient was not orthostatic by pulse or blood pressure    3:22 PM -  I reevaluated the patient at bedside. I discussed the patient's diagnostic study results which show no acute cardiac or pulmonary abnormalities. I discussed plan for discharge and follow up as outlined below. The patient is stable for discharge at this time and will return for any new or worsening symptoms. Patient  verbalizes understanding and support with my plan for discharge.       ASSESSMENT, COURSE AND PLAN:  PROBLEMS EVALUATED THIS VISIT:    This patient presents today after having syncope during coughing episode.  This is likely cough syncope.  He has no history of heart failure there is no evidence of arrhythmia.  He is not anemic.  There is no evidence of GI bleed.  He is not dehydrated.  He said no vomiting or diarrhea.  He does not have findings suggestive of PE or subarachnoid hemorrhage.      DISPOSITION AND DISCUSSIONS    RISK:  Patient denies currently have been a primary physician which is a barrier to care.  I will refer him to [facility]    PLAN:    Syncope handout given    Return for fainting without warning, fainting during exertion or for fainting with leg swelling or headache.     Please follow up with a primary physician for blood pressure management, diabetic screening, and all other preventive health concerns.     Followup:  [address] [address]  [phone]  Schedule an appointment as soon as possible for a visit   with a new primary for blood pressure and blood glucose checks      CONDITION: Good.     FINAL IMPRESSION  1. Cough syncope    2. Elevated blood-pressure reading without diagnosis of hypertension    3. Elevated random blood glucose level          Sasha HEART (Tiffanie), am scribing for, and in the presence of, Johnnie Grider M.D..    Electronically signed by: Sasha Garcia (Tiffanie), 8/30/2024    IJohnnie M.D. personally performed the services described in this documentation, as scribed by Sasha Garcia in my presence, and it is both accurate and complete.    The note accurately reflects work and decisions made by me.  Johnnie Grider M.D.  8/30/2024  8:34 PM

## 2024-08-30 NOTE — ED NOTES
The pt is alert and oriented. The pt ambulated to restroom with a steady gait. No indicators of pain or dizziness

## 2024-08-30 NOTE — ED NOTES
"Pt discharged home with spouse. The pt is alert and oriented, calm and cooperative, talks with clear coherent speech, ambulates with a steady gait, moves extremities to dress self. The pt denies pain. Vitals stable on the monitor. IV discontinued and gauze placed, pt in possession of belongings. Pt provided discharge education and information pertaining to medications and follow up appointments. Pt received copy of discharge instructions and verbalized understanding. BP (!) 132/91   Pulse 76   Temp 36.6 °C (97.8 °F) (Temporal)   Resp 14   Ht 1.803 m (5' 11\")   Wt (!) 155 kg (342 lb 6 oz)   SpO2 94%   BMI 47.75 kg/m²     "

## 2024-08-30 NOTE — ED NOTES
The pt is alert and oriented. The pt denies dizziness or pain. The pt ambulated with a steady gait to the room.

## 2024-08-30 NOTE — ED NOTES
Orthostatic pressures    Lying down  /88  RR 18  Pulse 78    Sitting   /99  RR 18  Pulse 81    Standing  / 109  RR 18  Pulse 83

## 2024-08-30 NOTE — DISCHARGE INSTRUCTIONS
Syncope (Fainting Episode)    Return for fainting without warning, fainting during exertion or for fainting with leg swelling or headache.  You are cleared to go to work.  Cough likely triggered a reflex yesterday and made you faint.  Follow-up with a new primary doctor for testing for diabetes and high blood pressure.    You have had a syncopal (fainting) spell. A fainting episode is a sudden, short-lived loss of consciousness. It results in complete recovery. It occurs because there has been a temporary shortage of oxygen and/or glucose (sugar) to the brain.    causes  Blood pressure pills and other medications that may lower blood pressure below normal. Sudden changes in posture (sudden standing).  Over medication. Take your medications as directed.  Standing too long. This can cause blood to pool in the legs.  Seizure disorders.  Low blood sugar (hypoglycemia) of diabetes. This more commonly causes coma.  Bearing down to go to the bathroom. This can cause your blood pressure to rise suddenly. Your body compensates by making the blood pressure too low when you stop bearing down.  Hardening of the arteries where the brain temporarily does not receive enough blood.  Irregular heart beat and circulatory problems.  Fear, emotional distress, injury, sight of blood, or illness.    Your caregiver will send you home if the syncope was from benign causes (not a reason to worry). Depending on your age and health, you may stay to be monitored and observed. If you return home, have someone stay with you if your caregiver feels that is desirable.    It is very important to keep all follow-up referrals and appointments in order to properly manage this condition.    This is a serious problem which can lead to serious illness and death if not carefully managed.      WARNING: Do not drive or operate machinery until your caregiver feels that it is safe for you to do so.    seek immediate medical attention if:  You have another  Vilma presents today to establish care.  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  She currently has a handicap placard that is about to .  She is requesting a new application in order to renew this.   fainting episode or faint while lying or sitting down. DO NOT DRIVE YOURSELF. Call 911 if no other help is available.  You have chest pain, nausea (feeling sick to your stomach), vomiting or abdominal pain.  You have an irregular heartbeat or one that is very fast (pulse over 120 beats per minute).  You have a loss of feeling in some part of your body or lose movement in your arms or legs.  You have difficulty with speech, confusion, severe weakness, or visual problems.  You become sweaty and/or feel light headed.    MAKE SURE YOU ARE RE-CHECKED AS INSTRUCTED    Document Released: 12/18/2006  Document Re-Released: 01/29/2008  [employer]® Patient Information ©2008 [employer].

## 2024-08-30 NOTE — ED TRIAGE NOTES
Pt had syncopal episode yesterday while sitting on chair. Pt states he was coughing then awoke on the ground. Pt notes transient sternal chest pain following fall, states he feels it is musculoskeletal as he also has left lateral rib and shoulder pain and believes this is where he struck with fall. Pt denies chest pain at this time, denies abd pain at this time. Pt a&o, neuro intact. Denies headache.   EKG taken in triage.     .Patient returned to [facility], educated regarding triage process. Pt educated to inform staff of any worsening symptoms or need for additional assistance.

## 2024-09-23 ENCOUNTER — OFFICE VISIT (OUTPATIENT)
Dept: MEDICAL GROUP | Facility: PHYSICIAN GROUP | Age: 54
End: 2024-09-23
Payer: COMMERCIAL

## 2024-09-23 VITALS
SYSTOLIC BLOOD PRESSURE: 124 MMHG | BODY MASS INDEX: 42.66 KG/M2 | HEART RATE: 69 BPM | DIASTOLIC BLOOD PRESSURE: 82 MMHG | HEIGHT: 72 IN | TEMPERATURE: 98 F | OXYGEN SATURATION: 93 % | WEIGHT: 315 LBS

## 2024-09-23 DIAGNOSIS — H72.92 PERFORATED TYMPANIC MEMBRANE, LEFT: ICD-10-CM

## 2024-09-23 DIAGNOSIS — T78.40XA ALLERGY, INITIAL ENCOUNTER: Chronic | ICD-10-CM

## 2024-09-23 DIAGNOSIS — E83.51 HYPOCALCEMIA: ICD-10-CM

## 2024-09-23 DIAGNOSIS — Z12.11 COLON CANCER SCREENING: ICD-10-CM

## 2024-09-23 DIAGNOSIS — R73.03 PREDIABETES: ICD-10-CM

## 2024-09-23 DIAGNOSIS — R05.4 COUGH SYNCOPE: ICD-10-CM

## 2024-09-23 DIAGNOSIS — Z00.00 WELL ADULT EXAM: ICD-10-CM

## 2024-09-23 DIAGNOSIS — Z11.59 NEED FOR HEPATITIS C SCREENING TEST: ICD-10-CM

## 2024-09-23 DIAGNOSIS — Z11.4 SCREENING FOR HIV (HUMAN IMMUNODEFICIENCY VIRUS): ICD-10-CM

## 2024-09-23 DIAGNOSIS — E66.01 SEVERE OBESITY (HCC): ICD-10-CM

## 2024-09-23 DIAGNOSIS — H92.02 DISCOMFORT OF LEFT EAR: ICD-10-CM

## 2024-09-23 DIAGNOSIS — R55 COUGH SYNCOPE: ICD-10-CM

## 2024-09-23 PROBLEM — E78.5 HYPERLIPIDEMIA: Status: RESOLVED | Noted: 2021-07-15 | Resolved: 2024-09-23

## 2024-09-23 PROBLEM — E66.3 OVERWEIGHT: Status: RESOLVED | Noted: 2021-07-14 | Resolved: 2024-09-23

## 2024-09-23 PROBLEM — Z76.89 ENCOUNTER TO ESTABLISH CARE WITH NEW DOCTOR: Status: RESOLVED | Noted: 2021-07-14 | Resolved: 2024-09-23

## 2024-09-23 PROCEDURE — 99214 OFFICE O/P EST MOD 30 MIN: CPT | Performed by: INTERNAL MEDICINE

## 2024-09-23 PROCEDURE — 3074F SYST BP LT 130 MM HG: CPT | Performed by: INTERNAL MEDICINE

## 2024-09-23 PROCEDURE — 3079F DIAST BP 80-89 MM HG: CPT | Performed by: INTERNAL MEDICINE

## 2024-09-23 ASSESSMENT — FIBROSIS 4 INDEX: FIB4 SCORE: 1.81

## 2024-09-23 NOTE — ASSESSMENT & PLAN NOTE
This is a new condition.  The patient was seen in the ER August 30, 2024.  Please refer to the ER note as well as imaging and lab test for detail.  The patient has not had any further symptoms since ER discharge.  Patient denies fever chills headache change in vision motor weakness paresthesia.

## 2024-09-23 NOTE — PROGRESS NOTES
PRIMARY CARE CLINIC VISIT        Chief Complaint   Patient presents with    Follow-Up   Follow-up ER visit  Cough syncope  Prediabetes  Obesity  Allergy  Hypocalcemia  Discomfort left ear  Colon cancer screening            History of Present Illness     Cough syncope  This is a new condition.  The patient was seen in the ER August 30, 2024.  Please refer to the ER note as well as imaging and lab test for detail.  The patient has not had any further symptoms since ER discharge.  Patient denies fever chills headache change in vision motor weakness paresthesia.        Allergies  Chronic condition.  The patient takes over-the-counter antihistamine as needed.  Patient denies fever chills shortness of breath or wheezing.    Prediabetes  Chronic condition.  Noted with chart review.  Lab test result discussed with the patient.  Recommend diet and exercise and to repeat the lab test next visit.    Severe obesity (HCC)  Chronic condition.  Discussed with the patient regarding diet, exercise, and lifestyle modification to help achieve and maintain healthy weight         Discomfort of left ear  This is a chronic condition.  Symptoms noted since the last few weeks.  Patient also reported slight decrease in hearing.  Patient denies fever or chills.  He denies recent trauma or injury.    Hypocalcemia  Chronic condition.  This noted with chart review.  Patient asymptomatic.  Recommend lab test to be done for follow-up    Colon cancer screening  Patient is due for colonoscopy.  Referral submitted today.    Current Outpatient Medications on File Prior to Visit   Medication Sig Dispense Refill    cyclobenzaprine (FLEXERIL) 10 mg Tab Take 1 Tablet by mouth 3 times a day as needed for Moderate Pain or Muscle Spasms. 30 Tablet 0    promethazine-dextromethorphan (PROMETHAZINE-DM) 6.25-15 MG/5ML syrup Take 5 mL by mouth 4 times a day as needed for Cough. 120 mL 0     No current facility-administered medications on file prior to visit.         Allergies: Banana, Cantaloupe, Rao flavor, Strawberry, and Watermelon [citrullus vulgaris]    Current Outpatient Medications Ordered in Epic   Medication Sig Dispense Refill    cyclobenzaprine (FLEXERIL) 10 mg Tab Take 1 Tablet by mouth 3 times a day as needed for Moderate Pain or Muscle Spasms. 30 Tablet 0    promethazine-dextromethorphan (PROMETHAZINE-DM) 6.25-15 MG/5ML syrup Take 5 mL by mouth 4 times a day as needed for Cough. 120 mL 0     No current Epic-ordered facility-administered medications on file.       Past Medical History:   Diagnosis Date    Asthma        History reviewed. No pertinent surgical history.    Family History   Problem Relation Age of Onset    Asthma Mother     Asthma Sister     Cancer Brother         1/2  brother w lung cancer       Social History     Tobacco Use   Smoking Status Some Days    Types: Cigarettes   Smokeless Tobacco Never       Social History     Substance and Sexual Activity   Alcohol Use Yes    Alcohol/week: 2.4 oz    Types: 4 Cans of beer per week       Review of systems  As per HPI above. All other systems reviewed and negative.      Past Medical, Social, and Family history reviewed and updated in [facility]       LAB DATA:     I have independently reviewed / interpreted labs    Lab Results   Component Value Date/Time    HBA1C 6.1 (H) 08/30/2024 12:52 PM    HBA1C 5.8 (H) 07/14/2021 08:51 AM        Lab Results   Component Value Date/Time    WBC 10.2 08/30/2024 12:52 PM    HEMOGLOBIN 16.5 08/30/2024 12:52 PM    HEMATOCRIT 49.2 08/30/2024 12:52 PM    MCV 92.1 08/30/2024 12:52 PM    PLATELETCT 192 08/30/2024 12:52 PM       Lab Results   Component Value Date/Time    SODIUM 134 (L) 08/30/2024 12:52 PM    POTASSIUM 4.2 08/30/2024 12:52 PM    GLUCOSE 147 (H) 08/30/2024 12:52 PM    BUN 20 08/30/2024 12:52 PM    CREATININE 1.35 08/30/2024 12:52 PM       Lab Results   Component Value Date/Time    CHOLSTRLTOT 194 07/14/2021 08:51 AM    TRIGLYCERIDE 239 (H) 07/14/2021 08:51 AM    HDL  43 07/14/2021 08:51 AM     (H) 07/14/2021 08:51 AM       Lab Results   Component Value Date/Time    GENEVA 44 08/30/2024 12:52 PM          Objective     /82 (BP Location: Left arm, Patient Position: Sitting, BP Cuff Size: Adult)   Pulse 69   Temp 36.7 °C (98 °F) (Temporal)   Ht 1.829 m (6')   Wt (!) 154 kg (338 lb 14.4 oz)   SpO2 93%    Body mass index is 45.96 kg/m².    General: alert in no apparent distress.  Cardiovascular: regular rate and rhythm  Pulmonary: lungs : no wheezing   Gastrointestinal: BS present.   Cranial nerves II through XII grossly intact  Mental status and speech appropriate  Left ear external canal is clear left TM there appeared to be perforation of the eardrum.  There is no redness discharge noted    Recent lab test result discussed with the patient    WBC 10.2 4.8 - 10.8 K/uL      RBC 5.34 4.70 - 6.10 M/uL     Hemoglobin 16.5 14.0 - 18.0 g/dL     Hematocrit 49.2 42.0 - 52.0 %     MCV 92.1 81.4 - 97.8 fL     MCH 30.9 27.0 - 33.0 pg     MCHC 33.5 32.3 - 36.5 g/dL     RDW 49.1 35.9 - 50.0 fL     Platelet Count 192 164 - 446 K/uL     MPV 10.3 9.0 - 12.9 fL     Neutrophils-Polys 58.40 44.00 - 72.00 %     Lymphocytes 28.70 22.00 - 41.00 %     Monocytes 8.30 0.00 - 13.40 %     Eosinophils 2.80 0.00 - 6.90 %     Basophils 0.70 0.00 - 1.80 %     Immature Granulocytes 1.10 (H) 0.00 - 0.90 %     Nucleated RBC 0.00 0.00 - 0.20 /100 WBC     Neutrophils (Absolute) 5.94 1.82 - 7.42 K/uL     Lymphs (Absolute) 2.92 1.00 - 4.80 K/uL     Monos (Absolute) 0.84 0.00 - 0.85 K/uL     Eos (Absolute) 0.28 0.00 - 0.51 K/uL     Baso (Absolute) 0.07 0.00 - 0.12 K/uL     Immature Granulocytes (abs) 0.11 0.00 - 0.11 K/uL     NRBC (Absolute) 0.00 K/uL   Complete Metabolic Panel (CMP)   Result Value Ref Range     Sodium 134 (L) 135 - 145 mmol/L     Potassium 4.2 3.6 - 5.5 mmol/L     Chloride 99 96 - 112 mmol/L     Co2 22 20 - 33 mmol/L     Anion Gap 13.0 7.0 - 16.0     Glucose 147 (H) 65 - 99 mg/dL      Bun 20 8 - 22 mg/dL     Creatinine 1.35 0.50 - 1.40 mg/dL     Calcium 8.5 8.5 - 10.5 mg/dL     Correct Calcium 8.4 (L) 8.5 - 10.5 mg/dL     AST(SGOT) 42 12 - 45 U/L     ALT(SGPT) 44 2 - 50 U/L     Alkaline Phosphatase 55 30 - 99 U/L     Total Bilirubin 0.4 0.1 - 1.5 mg/dL     Albumin 4.1 3.2 - 4.9 g/dL     Total Protein 7.0 6.0 - 8.2 g/dL     Globulin 2.9 1.9 - 3.5 g/dL     A-G Ratio 1.4 g/dL   Troponins NOW   Result Value Ref Range     Troponin T 7 6 - 19 ng/L         Assessment and Plan     1. Cough syncope  This is a new condition.  Please refer to the ER note for detail.  The patient currently asymptomatic.  At this time the patient declined further workup/testing.    2. Prediabetes  - HEMOGLOBIN A1C; Future  - Basic Metabolic Panel; Future  Chronic condition.  Uncontrolled.  Recent A1c 6.1%.  The patient declined to restart medical therapy at this time.  Recommend diet and exercise.  Patient has agreed to come back for follow-up in 3 to 4 months to repeat the lab test.    3. Severe obesity (HCC)  Chronic condition.  Uncontrolled.  Chart review show patient has gained approximately more than 15 pounds since the last visit 3 years ago.  Recommend diet and exercise.  The patient try to lose weight      4. Allergy, initial encounter  Chronic stable condition the patient may take over-the-counter antihistamine such as Claritin as needed.  Currently asymptomatic.      5. Hypocalcemia  Chronic condition.  Current status unclear.  Patient asymptomatic.  Repeat lab test ordered.    6. Discomfort of left ear  7. Perforated tympanic membrane, left  This is a new condition.  Cause unclear.  - Referral to ENT    8. Colon cancer screening  - Referral to GI for Colonoscopy    9. Well adult exam  - ALANINE AMINO-TRANS; Future  - Lipid Profile; Future  - PROSTATE SPECIFIC AG SCREENING; Future  - TSH; Future  - MICROALBUMIN CREAT RATIO URINE; Future  - VITAMIN D,25 HYDROXY (DEFICIENCY); Future  - CBC WITH DIFFERENTIAL;  Future  Routine lab requested.  Advised the patient to follow-up after lab test done.                    Please note that this dictation was created using voice recognition software. I have made every reasonable attempt to correct obvious errors, but I expect that there are errors of grammar and possibly content that I did not discover before finalizing the note.    Homer Rondon MD  Internal Medicine  [facility]

## 2024-09-23 NOTE — ASSESSMENT & PLAN NOTE
This is a chronic condition.  Symptoms noted since the last few weeks.  Patient also reported slight decrease in hearing.  Patient denies fever or chills.  He denies recent trauma or injury.

## 2024-09-23 NOTE — ASSESSMENT & PLAN NOTE
Chronic condition.  This noted with chart review.  Patient asymptomatic.  Recommend lab test to be done for follow-up

## 2024-09-23 NOTE — ASSESSMENT & PLAN NOTE
Chronic condition.  The patient takes over-the-counter antihistamine as needed.  Patient denies fever chills shortness of breath or wheezing.

## 2024-09-23 NOTE — ASSESSMENT & PLAN NOTE
Chronic condition.  Noted with chart review.  Lab test result discussed with the patient.  Recommend diet and exercise and to repeat the lab test next visit.

## 2025-01-08 ENCOUNTER — OFFICE VISIT (OUTPATIENT)
Dept: MEDICAL GROUP | Facility: PHYSICIAN GROUP | Age: 55
End: 2025-01-08
Payer: COMMERCIAL

## 2025-01-08 VITALS
SYSTOLIC BLOOD PRESSURE: 118 MMHG | DIASTOLIC BLOOD PRESSURE: 74 MMHG | HEIGHT: 72 IN | OXYGEN SATURATION: 94 % | WEIGHT: 315 LBS | BODY MASS INDEX: 42.66 KG/M2 | HEART RATE: 85 BPM | TEMPERATURE: 97.8 F

## 2025-01-08 DIAGNOSIS — T78.40XA ALLERGY, INITIAL ENCOUNTER: Chronic | ICD-10-CM

## 2025-01-08 DIAGNOSIS — E66.01 SEVERE OBESITY (HCC): Chronic | ICD-10-CM

## 2025-01-08 DIAGNOSIS — E78.5 DYSLIPIDEMIA: ICD-10-CM

## 2025-01-08 DIAGNOSIS — R05.1 ACUTE COUGH: ICD-10-CM

## 2025-01-08 DIAGNOSIS — R73.03 PREDIABETES: Chronic | ICD-10-CM

## 2025-01-08 PROBLEM — R55 COUGH SYNCOPE: Status: RESOLVED | Noted: 2024-09-23 | Resolved: 2025-01-08

## 2025-01-08 PROBLEM — R05.4 COUGH SYNCOPE: Status: RESOLVED | Noted: 2024-09-23 | Resolved: 2025-01-08

## 2025-01-08 LAB
FLUAV RNA SPEC QL NAA+PROBE: NEGATIVE
FLUBV RNA SPEC QL NAA+PROBE: NEGATIVE
RSV RNA SPEC QL NAA+PROBE: NEGATIVE
S PYO DNA SPEC NAA+PROBE: NOT DETECTED
SARS-COV-2 RNA RESP QL NAA+PROBE: NEGATIVE

## 2025-01-08 PROCEDURE — 3074F SYST BP LT 130 MM HG: CPT | Performed by: INTERNAL MEDICINE

## 2025-01-08 PROCEDURE — 87651 STREP A DNA AMP PROBE: CPT | Performed by: INTERNAL MEDICINE

## 2025-01-08 PROCEDURE — 0241U POCT CEPHEID COV-2, FLU A/B, RSV - PCR: CPT | Performed by: INTERNAL MEDICINE

## 2025-01-08 PROCEDURE — 99214 OFFICE O/P EST MOD 30 MIN: CPT | Performed by: INTERNAL MEDICINE

## 2025-01-08 PROCEDURE — 3078F DIAST BP <80 MM HG: CPT | Performed by: INTERNAL MEDICINE

## 2025-01-08 RX ORDER — BENZONATATE 100 MG/1
100 CAPSULE ORAL 3 TIMES DAILY PRN
Qty: 60 CAPSULE | Refills: 0 | Status: SHIPPED | OUTPATIENT
Start: 2025-01-08

## 2025-01-08 RX ORDER — ALBUTEROL SULFATE 90 UG/1
1-2 INHALANT RESPIRATORY (INHALATION) EVERY 6 HOURS PRN
Qty: 1 EACH | Refills: 6 | Status: SHIPPED | OUTPATIENT
Start: 2025-01-08

## 2025-01-08 ASSESSMENT — FIBROSIS 4 INDEX: FIB4 SCORE: 1.81

## 2025-01-08 ASSESSMENT — PATIENT HEALTH QUESTIONNAIRE - PHQ9: CLINICAL INTERPRETATION OF PHQ2 SCORE: 0

## 2025-01-09 NOTE — ASSESSMENT & PLAN NOTE
This is an acute condition   symptoms noted since last few days.  Patient denies fever or chills.  No shortness of breath or wheezing.  Patient has tried some medication over-the-counter without improvement.

## 2025-01-09 NOTE — ASSESSMENT & PLAN NOTE
Chronic condition.  The patient presently on diet therapy.  Previous lab test result discussed with the patient.

## 2025-01-09 NOTE — ASSESSMENT & PLAN NOTE
Body mass index is 45.46 kg/m².     Chronic condition.  Recommend pt to follow a healthy , well balance diet  Pt to continue with regular exercise activity and to maintain ideal weight.

## 2025-01-09 NOTE — PROGRESS NOTES
PRIMARY CARE CLINIC VISIT        Chief Complaint   Patient presents with    Follow-Up     Sick since Saturday, cough, runny nose, chest congestion, feeling weak      Acute cough  Allergy  Prediabetes  Obesity   Hyperlipidemia    History of Present Illness     Acute cough  This is an acute condition   symptoms noted since last few days.  Patient denies fever or chills.  No shortness of breath or wheezing.  Patient has tried some medication over-the-counter without improvement.    Allergies  Chronic condition.  The patient is taking over-the-counter antihistamine as needed.    Prediabetes  Chronic condition.  Patient currently on diet therapy.  Previous lab test result discussed with the patient.    Severe obesity (HCC)  Body mass index is 45.46 kg/m².     Chronic condition.  Recommend pt to follow a healthy , well balance diet  Pt to continue with regular exercise activity and to maintain ideal weight.        Dyslipidemia  Chronic condition.  The patient presently on diet therapy.  Previous lab test result discussed with the patient.    Current Outpatient Medications on File Prior to Visit   Medication Sig Dispense Refill    cyclobenzaprine (FLEXERIL) 10 mg Tab Take 1 Tablet by mouth 3 times a day as needed for Moderate Pain or Muscle Spasms. 30 Tablet 0    promethazine-dextromethorphan (PROMETHAZINE-DM) 6.25-15 MG/5ML syrup Take 5 mL by mouth 4 times a day as needed for Cough. 120 mL 0     No current facility-administered medications on file prior to visit.        Allergies: Banana, Cantaloupe, Sherando flavor, Strawberry, and Watermelon [citrullus vulgaris]    Current Outpatient Medications Ordered in Epic   Medication Sig Dispense Refill    benzonatate (TESSALON) 100 MG Cap Take 1 Capsule by mouth 3 times a day as needed for Cough. 60 Capsule 0    cyclobenzaprine (FLEXERIL) 10 mg Tab Take 1 Tablet by mouth 3 times a day as needed for Moderate Pain or Muscle Spasms. 30 Tablet 0    promethazine-dextromethorphan  (PROMETHAZINE-DM) 6.25-15 MG/5ML syrup Take 5 mL by mouth 4 times a day as needed for Cough. 120 mL 0     No current Epic-ordered facility-administered medications on file.       Past Medical History:   Diagnosis Date    Asthma        History reviewed. No pertinent surgical history.    Family History   Problem Relation Age of Onset    Asthma Mother     Asthma Sister     Cancer Brother         1/2  brother w lung cancer       Social History     Tobacco Use   Smoking Status Some Days    Types: Cigarettes   Smokeless Tobacco Never       Social History     Substance and Sexual Activity   Alcohol Use Yes    Alcohol/week: 2.4 oz    Types: 4 Cans of beer per week       Review of systems  As per HPI above. All other systems reviewed and negative.      Past Medical, Social, and Family history reviewed and [facility]       LAB DATA:     I have independently reviewed / interpreted labs    Lab Results   Component Value Date/Time    HBA1C 6.1 (H) 08/30/2024 12:52 PM    HBA1C 5.8 (H) 07/14/2021 08:51 AM        Lab Results   Component Value Date/Time    WBC 10.2 08/30/2024 12:52 PM    HEMOGLOBIN 16.5 08/30/2024 12:52 PM    HEMATOCRIT 49.2 08/30/2024 12:52 PM    MCV 92.1 08/30/2024 12:52 PM    PLATELETCT 192 08/30/2024 12:52 PM       Lab Results   Component Value Date/Time    SODIUM 134 (L) 08/30/2024 12:52 PM    POTASSIUM 4.2 08/30/2024 12:52 PM    GLUCOSE 147 (H) 08/30/2024 12:52 PM    BUN 20 08/30/2024 12:52 PM    CREATININE 1.35 08/30/2024 12:52 PM       Lab Results   Component Value Date/Time    CHOLSTRLTOT 194 07/14/2021 08:51 AM    TRIGLYCERIDE 239 (H) 07/14/2021 08:51 AM    HDL 43 07/14/2021 08:51 AM     (H) 07/14/2021 08:51 AM       Lab Results   Component Value Date/Time    ALTSGPT 44 08/30/2024 12:52 PM          Objective     /74 (BP Location: Right arm, Patient Position: Sitting, BP Cuff Size: Adult)   Pulse 85   Temp 36.6 °C (97.8 °F) (Temporal)   Ht 1.829 m (6')   Wt (!) 152 kg (335 lb 3.2 oz)    SpO2 94%    Body mass index is 45.46 kg/m².    General: alert in no apparent distress.  Cardiovascular: regular rate and rhythm  Pulmonary: lungs : Decreased breath sound the bases.  Minimal wheezing noted  gastrointestinal: BS present.   Sinus exam no purulent drainage  Oropharynx with erythema.  No exudates noted    Assessment and Plan     1. Acute cough  Acute condition.  Uncontrolled.  Suspect viral URI   rec  - DX-CHEST-2 VIEWS; Future  - POCT CEPHEID COV-2, FLU A/B, RSV - PCR  - POCT CEPHEID GROUP A STREP - PCR  - benzonatate (TESSALON) 100 MG Cap; Take 1 Capsule by mouth 3 times a day as needed for Cough.  Dispense: 60 Capsule; Refill: 0      Symptomatic and supportive care recommended:   Advised pt to stay well hydrated and plenty of rest   For sore throat: rec warm salt water gargles soft foods. Throat coats /chloraseptic sprays prn.  Saline nasal spray and Flonase as a decongestant.   Infection control measures:  Frequent Hand washings with soap and water, covering sneeze/cough, clean/disinfect surfaces. Avoid close contact with sick people.  May try otc mucinex as needed [expectorant]  Did write a note for the patient to be off work from January 8, 2025 to January 14, 2025 return to work on January 15, 2025    Go to urgent care or ER is symptoms worsen /change such as temperature >101, worsening shortness of breath, wheezing, worsening cough, chest pain, dizziness, lightheadedness, lethargy, confusion, headaches, change in vision, motor weakness, abdominal pain, the inability to keep fluids/ foods down, etc... or any change in the pt's condition.        2. Allergy, initial encounter  Chronic stable condition.  The patient may take Benadryl 25 mg at bedtime as needed    3. Prediabetes  - HEMOGLOBIN A1C; Future  - Basic Metabolic Panel; Future  Chronic condition.  Current status unclear.  Blood test requested to check A1c.  Recommend diet and lifestyle modification.    4. Severe obesity (HCC)  Chronic  condition.  Uncontrolled.  Recommend diet and lifestyle modification.  Encouraged patient to lose weight    5. Dyslipidemia  - ALANINE AMINO-TRANS; Future  - Lipid Profile; Future  Chronic condition.  Current status unclear.  Lipid panel requested.  Continue with low-cholesterol low-fat diet.                  Please note that this dictation was created using voice recognition software. I have made every reasonable attempt to correct obvious errors, but I expect that there are errors of grammar and possibly content that I did not discover before finalizing the note.    Homer Rondon MD  Internal Medicine  [facility]

## 2025-01-16 ENCOUNTER — TELEPHONE (OUTPATIENT)
Dept: MEDICAL GROUP | Facility: PHYSICIAN GROUP | Age: 55
End: 2025-01-16
Payer: COMMERCIAL

## 2025-01-16 NOTE — TELEPHONE ENCOUNTER
FINAL PRIOR AUTHORIZATION STATUS:    1.  Name of Medication & Dose: albuterol sulfate      2. Prior Auth Status: advised pharmacy to call help line    3. Action Taken: Pharmacy Notified: no Patient Notified: no

## 2025-08-28 ENCOUNTER — HOSPITAL ENCOUNTER (EMERGENCY)
Facility: MEDICAL CENTER | Age: 55
End: 2025-08-28
Attending: EMERGENCY MEDICINE
Payer: COMMERCIAL

## 2025-08-28 ENCOUNTER — APPOINTMENT (OUTPATIENT)
Dept: RADIOLOGY | Facility: MEDICAL CENTER | Age: 55
End: 2025-08-28
Attending: EMERGENCY MEDICINE
Payer: COMMERCIAL

## 2025-08-28 VITALS
TEMPERATURE: 97 F | HEART RATE: 79 BPM | OXYGEN SATURATION: 94 % | WEIGHT: 315 LBS | BODY MASS INDEX: 42.66 KG/M2 | RESPIRATION RATE: 16 BRPM | DIASTOLIC BLOOD PRESSURE: 90 MMHG | SYSTOLIC BLOOD PRESSURE: 149 MMHG | HEIGHT: 72 IN

## 2025-08-28 DIAGNOSIS — I10 HYPERTENSION, UNSPECIFIED TYPE: ICD-10-CM

## 2025-08-28 DIAGNOSIS — S83.91XA SPRAIN OF RIGHT KNEE, UNSPECIFIED LIGAMENT, INITIAL ENCOUNTER: Primary | ICD-10-CM

## 2025-08-28 PROCEDURE — 73560 X-RAY EXAM OF KNEE 1 OR 2: CPT | Mod: RT

## 2025-08-28 PROCEDURE — 99283 EMERGENCY DEPT VISIT LOW MDM: CPT

## 2025-08-28 ASSESSMENT — PAIN DESCRIPTION - PAIN TYPE: TYPE: ACUTE PAIN

## 2025-08-28 ASSESSMENT — LIFESTYLE VARIABLES: HOW OFTEN DO YOU HAVE SIX OR MORE DRINKS ON ONE OCCASION: WEEKLY

## 2025-08-28 ASSESSMENT — FIBROSIS 4 INDEX: FIB4 SCORE: 1.85
